# Patient Record
Sex: FEMALE | Race: WHITE | NOT HISPANIC OR LATINO | Employment: FULL TIME | URBAN - METROPOLITAN AREA
[De-identification: names, ages, dates, MRNs, and addresses within clinical notes are randomized per-mention and may not be internally consistent; named-entity substitution may affect disease eponyms.]

---

## 2021-02-25 ENCOUNTER — PRENATAL OFFICE VISIT (OUTPATIENT)
Dept: NURSING | Facility: CLINIC | Age: 36
End: 2021-02-25
Payer: COMMERCIAL

## 2021-02-25 DIAGNOSIS — Z34.90 SUPERVISION OF NORMAL PREGNANCY: Primary | ICD-10-CM

## 2021-02-25 PROCEDURE — 99207 PR NO CHARGE NURSE ONLY: CPT

## 2021-02-25 RX ORDER — PNV NO.95/FERROUS FUM/FOLIC AC 28MG-0.8MG
TABLET ORAL
COMMUNITY

## 2021-02-25 SDOH — HEALTH STABILITY: MENTAL HEALTH: HOW OFTEN DO YOU HAVE 6 OR MORE DRINKS ON ONE OCCASION?: NOT ASKED

## 2021-02-25 SDOH — HEALTH STABILITY: MENTAL HEALTH: HOW MANY STANDARD DRINKS CONTAINING ALCOHOL DO YOU HAVE ON A TYPICAL DAY?: NOT ASKED

## 2021-02-25 SDOH — HEALTH STABILITY: MENTAL HEALTH: HOW OFTEN DO YOU HAVE A DRINK CONTAINING ALCOHOL?: NOT ASKED

## 2021-02-25 NOTE — NURSING NOTE
NPN nurse visit done over the phone. Pt will be given NPN folder and book at her upcoming appt.   Discussed optional screening available to assess chromosomal anomalies. Questions answered. Pt advised to call the clinic if she has any questions or concerns related to her pregnancy. Prenatal labs will be obtained at her upcoming appt. New prenatal visit scheduled on 3/5 with Dr. Edgar Loaiza.    7w4d    Pap is due.        Patient supplied answers from flow sheet for:  Prenatal OB Questionnaire.  Past Medical History  Diabetes?: No  Hypertension : No  Heart disease, mitral valve prolapse or rheumatic fever?: No  An autoimmune disease such as lupus or rheumatoid arthritis?: No  Kidney disease or urinary tract infection?: (!) Yes(hx of UTI)  Epilepsy, seizures or spells?: No  Migraine headaches?: (!) Yes  A stroke or loss of function or sensation?: No  Any other neurological problems?: No  Have you ever been treated for depression?: No  Are you having problems with crying spells or loss of self-esteem?: No  Have you ever required psychiatric care?: No  Have you ever had hepatitis, liver disease or jaundice?: No  Have you been treated for blood clots in your veins, deep vein thromosis, inflammation in the veins, thrombosis, phlebitis, pulmonary embolism or varicosities?: No  Have you had excessive bleeding after surgery or dental work?: (!) Yes(postpartum hemorrhage)  Do you bleed more than other women after a cut or scratch?: No  Do you have a history of anemia?: No  Have you ever had thyroid problems or taken thyroid medication?: No   Do you have any endocrine problems?: No  Have you ever been in a major accident or suffered serious trauma?: No  Within the last year, has anyone hit, slapped, kicked or otherwise hurt you?: No  In the last year, has anyone forced you to have sex when you didn't want to?: No    Past Medical History 2   Have you ever received a blood transfusion?: No  Would you refuse a blood transfusion if  a doctor judged it to be medically necessary?: No   If you answered Yes, would you rather die than receive a blood transfusion?: No  If you answered Yes, is this for Anabaptist reasons?: No  Does anyone in your home smoke?: No  Do you use tobacco products?: No  Do you drink beer, wine or hard liquor?: No  Do you use any of the following: marijuana, speed, cocaine, heroin, hallucinogens or other drugs?: No   Is your blood type Rh negative?: No  Have you ever had abnormal antibodies in your blood?: No  Have you ever had asthma?: No  Have you ever had tuberculosis?: No  Do you have any allergies to drugs or over-the-counter medications?: No  Allergies: Dust Mites, Aspartame, Ethanol, Venlafaxine, Hydrochloride, Sertraline: No  Have you had any breast problems?: No  Have you ever ?: (!) Yes  Have you had any gynecological surgical procedures such as cervical conization, a LEEP procedure, laser treatment, cryosurgery of the cervix or a dilation and curettage, etc?: No  Have you ever had any other surgical procedures?: (!) Yes  Have you been hospitalized for a nonsurgical reason excluding normal delivery?: No  Have you ever had any anesthetic complications?: No  Have you ever had an abnormal pap smear?: (!) Yes(colp done)    Past Medical History (Continued)  Do you have a history of abnormalities of the uterus?: No  Did your mother take MALLIKA or any other hormones when she was pregnant with you?: No  Did it take you more than a year to become pregnant?: No  Have you ever been evaluated or treated for infertility?: No  Is there a history of medical problems in your family, which you feel may be important to this pregnancy?: No  Do you have any other problems we have not asked about which you feel may be important to this pregnancy?: No    Symptoms since last menstrual period  Do you have any of the following symptoms: abdominal pain, blood in stools or urine, chest pain, shortness of breath, coughing or vomiting up  blood, your heart racing or skipping beats, nausea and vomiting, pain on urination or vaginal discharge or bleed: (!) Yes(nausea, fatigue, HA, breast tenderness)  Current medications, including over-the-counter medications, you are using? (If not applicable answer none): pn vitamin  Will the patient be 35 years old or older at the time of delivery?: (!) Yes    Has the patient, baby's father or anyone in either family had:  Thalassemia (Italian, Greek, Mediterranean or  background only) and an MCV result less than 80?: No  Neural tube defect such as meningomyelocele, spina bifida or anencephaly?: No  Congenital heart defect?: No  Down's Syndrome?: No  Kb-Sachs disease (Presybeterian, Cajun, Northern Irish-Crosby)?: No  Sickle cell disease or trait ()?: No  Hemophilia or other inherited problems of blood?: No  Muscular dystrophy?: No  Cystic fibrosis?: No  [place]'s chorea?: No  Mental retardation/autism?: No  If yes, was the person tested for fragile X?: No  Any other inherited genetic or chromosomal disorder?: No  Maternal metabolic disorder (e.g Insulin-dependent diabetes, PKU)?: No  A child with birth defects not listed above?: No  Recurrent pregnancy loss or stillbirth?: No   Has the patient had any medications/street drugs/alcohol since her last menstrual period?: No  Does the patient or baby's father have any other genetic risks?: No    Infection History   Do you object to being tested for Hepatitis B?: No  Do you object to being tested for HIV?: No   Do you feel that you are at high risk for coming in contact with the AIDS virus?: No  Have you ever been treated for tuberculosis?: No  Have you ever had a positive skin test for tuberculosis?: No  Do you live with someone who has tuberculosis?: No  Have you ever been exposed to tuberculosis?: No  Do you have genital herpes?: No  Does your partner have genital herpes?: No  Have you had a viral illness since your last period?: No  Have you ever had gonorrhea,  chlamydia, syphilis, venereal warts, trichomoniasis, pelvic inflammatory disease or any other sexually transmitted disease?: No  Do you know if you are a genital group B streptococcus carrier?: No  Have you had chicken pox/varicella?: (!) Yes   Have you been vaccinated against chicken Pox?: No  Have you had any other infectious diseases?: No    Tootie JOSE RN

## 2021-03-05 ENCOUNTER — PRENATAL OFFICE VISIT (OUTPATIENT)
Dept: OBGYN | Facility: CLINIC | Age: 36
End: 2021-03-05
Payer: COMMERCIAL

## 2021-03-05 ENCOUNTER — ANCILLARY PROCEDURE (OUTPATIENT)
Dept: ULTRASOUND IMAGING | Facility: CLINIC | Age: 36
End: 2021-03-05
Payer: COMMERCIAL

## 2021-03-05 VITALS — SYSTOLIC BLOOD PRESSURE: 110 MMHG | DIASTOLIC BLOOD PRESSURE: 70 MMHG | WEIGHT: 131 LBS

## 2021-03-05 DIAGNOSIS — Z34.90 SUPERVISION OF NORMAL PREGNANCY: ICD-10-CM

## 2021-03-05 DIAGNOSIS — Z11.3 SCREEN FOR STD (SEXUALLY TRANSMITTED DISEASE): ICD-10-CM

## 2021-03-05 DIAGNOSIS — Z12.4 SCREENING FOR CERVICAL CANCER: ICD-10-CM

## 2021-03-05 DIAGNOSIS — Z34.80 SUPERVISION OF OTHER NORMAL PREGNANCY, ANTEPARTUM: Primary | ICD-10-CM

## 2021-03-05 DIAGNOSIS — Z34.81 ENCOUNTER FOR SUPERVISION OF OTHER NORMAL PREGNANCY IN FIRST TRIMESTER: ICD-10-CM

## 2021-03-05 LAB
ABO + RH BLD: NORMAL
ABO + RH BLD: NORMAL
BLD GP AB SCN SERPL QL: NORMAL
BLOOD BANK CMNT PATIENT-IMP: NORMAL
ERYTHROCYTE [DISTWIDTH] IN BLOOD BY AUTOMATED COUNT: 12.3 % (ref 10–15)
HCT VFR BLD AUTO: 39.1 % (ref 35–47)
HGB BLD-MCNC: 12.6 G/DL (ref 11.7–15.7)
MCH RBC QN AUTO: 32.8 PG (ref 26.5–33)
MCHC RBC AUTO-ENTMCNC: 32.2 G/DL (ref 31.5–36.5)
MCV RBC AUTO: 102 FL (ref 78–100)
PLATELET # BLD AUTO: 220 10E9/L (ref 150–450)
RBC # BLD AUTO: 3.84 10E12/L (ref 3.8–5.2)
SPECIMEN EXP DATE BLD: NORMAL
WBC # BLD AUTO: 7.3 10E9/L (ref 4–11)

## 2021-03-05 PROCEDURE — 87591 N.GONORRHOEAE DNA AMP PROB: CPT | Performed by: OBSTETRICS & GYNECOLOGY

## 2021-03-05 PROCEDURE — 86850 RBC ANTIBODY SCREEN: CPT | Performed by: OBSTETRICS & GYNECOLOGY

## 2021-03-05 PROCEDURE — 87491 CHLMYD TRACH DNA AMP PROBE: CPT | Performed by: OBSTETRICS & GYNECOLOGY

## 2021-03-05 PROCEDURE — 87340 HEPATITIS B SURFACE AG IA: CPT | Performed by: OBSTETRICS & GYNECOLOGY

## 2021-03-05 PROCEDURE — 86780 TREPONEMA PALLIDUM: CPT | Mod: 90 | Performed by: OBSTETRICS & GYNECOLOGY

## 2021-03-05 PROCEDURE — 86901 BLOOD TYPING SEROLOGIC RH(D): CPT | Performed by: OBSTETRICS & GYNECOLOGY

## 2021-03-05 PROCEDURE — 99207 PR FIRST OB VISIT: CPT | Performed by: OBSTETRICS & GYNECOLOGY

## 2021-03-05 PROCEDURE — 87086 URINE CULTURE/COLONY COUNT: CPT | Performed by: OBSTETRICS & GYNECOLOGY

## 2021-03-05 PROCEDURE — 76801 OB US < 14 WKS SINGLE FETUS: CPT | Performed by: OBSTETRICS & GYNECOLOGY

## 2021-03-05 PROCEDURE — 87389 HIV-1 AG W/HIV-1&-2 AB AG IA: CPT | Performed by: OBSTETRICS & GYNECOLOGY

## 2021-03-05 PROCEDURE — 99000 SPECIMEN HANDLING OFFICE-LAB: CPT | Performed by: OBSTETRICS & GYNECOLOGY

## 2021-03-05 PROCEDURE — 36415 COLL VENOUS BLD VENIPUNCTURE: CPT | Performed by: OBSTETRICS & GYNECOLOGY

## 2021-03-05 PROCEDURE — 85027 COMPLETE CBC AUTOMATED: CPT | Performed by: OBSTETRICS & GYNECOLOGY

## 2021-03-05 PROCEDURE — 86762 RUBELLA ANTIBODY: CPT | Performed by: OBSTETRICS & GYNECOLOGY

## 2021-03-05 PROCEDURE — 87624 HPV HI-RISK TYP POOLED RSLT: CPT | Performed by: OBSTETRICS & GYNECOLOGY

## 2021-03-05 PROCEDURE — 86900 BLOOD TYPING SEROLOGIC ABO: CPT | Performed by: OBSTETRICS & GYNECOLOGY

## 2021-03-05 PROCEDURE — G0145 SCR C/V CYTO,THINLAYER,RESCR: HCPCS | Performed by: OBSTETRICS & GYNECOLOGY

## 2021-03-05 NOTE — NURSING NOTE
Chief Complaint   Patient presents with     Prenatal Care     8 5/7 weeks       Initial /70   Wt 59.4 kg (131 lb)   LMP 2021 (Exact Date)  There is no height or weight on file to calculate BMI.  BP completed using cuff size: regular    Questioned patient about current smoking habits.  Pt. has never smoked.          The following HM Due: pap smear    Ivory Cadena, CMA

## 2021-03-05 NOTE — PROGRESS NOTES
Carri is a 35 year old  at 8w5d here for new ob visit.      Here with FOB Partner Travis.  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   Planned pregnancy. She has a 10 year old daughter, had a retained placenta and PPH in that pregnancy.  Thinks she may be rh neg.   Last pap  was NILM, due again.   AMA - will  Be barely 36 at time of delivery    OB History    Para Term  AB Living   2 1 1 0 0 1   SAB TAB Ectopic Multiple Live Births   0 0 0 0 1      # Outcome Date GA Lbr Dale/2nd Weight Sex Delivery Anes PTL Lv   2 Current            1 Term 06/01/10 39w6d  3.175 kg (7 lb) F Vag-Spont EPI N KIARRA      Complications: Hemorrhage      Name: Hortencia       ROS: Ten point review of systems was reviewed and negative except the above.    No past medical history on file.  Past Surgical History:   Procedure Laterality Date     CO BREAST AUGMENTATION Bilateral      There are no active problems to display for this patient.     No Known Allergies  Prenatal Vit-Fe Fumarate-FA (PRENATAL VITAMIN) 27-0.8 MG TABS,     No current facility-administered medications on file prior to visit.       Physical Exam:   /70   Wt 59.4 kg (131 lb)   LMP 2021 (Exact Date)     Gen:  no acute distress, comfortable, smiling  HENT: No scleral injection or icterus  CV: Regular rate and rhythm, no m/g/r  Resp: Normal work of breathing, no cough  GI: Abdomen soft, non-tender. No masses, organomegaly  Skin: No suspicious lesions or rashes  Psychiatric: mentation appears normal and affect bright  : retroverted uterus, gc/chlam and pap obtained with scant bleeding from cervix    FH cwd  175 bpm on US    A/P 35 year old  at 8w5d here for NOB visit.  - Discussed physician coverage, tertiary support, diet, exercise, weight gain, schedule of visits, routine and indicated ultrasounds, and childbirth education.  Discussed MFM coverage and reviewed options for  testing in the first  trimester.  Recommended PNV.  NOB labs and US reviewed.       Concerns:  - labs pending.  FOB Travis.  - h/o PPH with retained placenta  - AMA: plan level 2 US.  NIPT offered after 10 wks, will consider.     RTC 4 weeks    Va Loaiza MD, MPH  [facility]

## 2021-03-06 LAB
BACTERIA SPEC CULT: NORMAL
C TRACH DNA SPEC QL NAA+PROBE: NEGATIVE
Lab: NORMAL
N GONORRHOEA DNA SPEC QL NAA+PROBE: NEGATIVE
SPECIMEN SOURCE: NORMAL
T PALLIDUM AB SER QL: NONREACTIVE

## 2021-03-08 LAB
HBV SURFACE AG SERPL QL IA: NONREACTIVE
HIV 1+2 AB+HIV1 P24 AG SERPL QL IA: NONREACTIVE
RUBV IGG SERPL IA-ACNC: 73 IU/ML

## 2021-03-09 LAB
COPATH REPORT: NORMAL
PAP: NORMAL

## 2021-03-11 LAB
FINAL DIAGNOSIS: NORMAL
HPV HR 12 DNA CVX QL NAA+PROBE: NEGATIVE
HPV16 DNA SPEC QL NAA+PROBE: NEGATIVE
HPV18 DNA SPEC QL NAA+PROBE: NEGATIVE
SPECIMEN DESCRIPTION: NORMAL
SPECIMEN SOURCE CVX/VAG CYTO: NORMAL

## 2021-03-14 ENCOUNTER — HEALTH MAINTENANCE LETTER (OUTPATIENT)
Age: 36
End: 2021-03-14

## 2021-03-29 ENCOUNTER — PRENATAL OFFICE VISIT (OUTPATIENT)
Dept: OBGYN | Facility: CLINIC | Age: 36
End: 2021-03-29
Payer: COMMERCIAL

## 2021-03-29 ENCOUNTER — TELEPHONE (OUTPATIENT)
Dept: OBGYN | Facility: CLINIC | Age: 36
End: 2021-03-29

## 2021-03-29 ENCOUNTER — TRANSCRIBE ORDERS (OUTPATIENT)
Dept: MATERNAL FETAL MEDICINE | Facility: CLINIC | Age: 36
End: 2021-03-29

## 2021-03-29 VITALS — SYSTOLIC BLOOD PRESSURE: 110 MMHG | WEIGHT: 131 LBS | DIASTOLIC BLOOD PRESSURE: 68 MMHG

## 2021-03-29 DIAGNOSIS — Z11.52 ENCOUNTER FOR PREOPERATIVE SCREENING LABORATORY TESTING FOR COVID-19 VIRUS: Primary | ICD-10-CM

## 2021-03-29 DIAGNOSIS — O09.529 HIGH-RISK PREGNANCY, ELDERLY MULTIGRAVIDA, UNSPECIFIED TRIMESTER: ICD-10-CM

## 2021-03-29 DIAGNOSIS — Z67.91 RH NEGATIVE STATE IN ANTEPARTUM PERIOD: ICD-10-CM

## 2021-03-29 DIAGNOSIS — O03.4 INCOMPLETE MISCARRIAGE: Primary | ICD-10-CM

## 2021-03-29 DIAGNOSIS — O26.90 PREGNANCY RELATED CONDITION, ANTEPARTUM: Primary | ICD-10-CM

## 2021-03-29 DIAGNOSIS — O26.899 RH NEGATIVE STATE IN ANTEPARTUM PERIOD: ICD-10-CM

## 2021-03-29 DIAGNOSIS — Z01.812 ENCOUNTER FOR PREOPERATIVE SCREENING LABORATORY TESTING FOR COVID-19 VIRUS: Primary | ICD-10-CM

## 2021-03-29 PROCEDURE — 76801 OB US < 14 WKS SINGLE FETUS: CPT | Performed by: OBSTETRICS & GYNECOLOGY

## 2021-03-29 PROCEDURE — 99215 OFFICE O/P EST HI 40 MIN: CPT | Performed by: OBSTETRICS & GYNECOLOGY

## 2021-03-29 NOTE — PROGRESS NOTES
SUBJECTIVE:                                                   CC:  Patient presents with:  Prenatal Care: 12 1/7 weeks      HPI:  Carri Sauer is a 35 year old  who presents for PNC at 12w1d.  No FHT found on doptones, none on BSUS.  She was sent immediately for a formal ultrasound which showed no FHT and measuring 9w4d.    No cramping or bleeding.  Is RH neg, will need rhogam.    OBJECTIVE:     /68   Wt 59.4 kg (131 lb)   LMP 2021 (Exact Date)     Gen: Healthy appearing thin female, no acute distress, comfortable, appropriately tearful when discussing results  Heart RRR  Lungs CTAB  Abd soft flat NT  Ext WWP   deferred today     Test Results:  BSUS with no FHT, measuring 9 weeks    Final formal US read pending - I reviewed the images from the formal US and saw CRL measuring 9w4d with no FHT      ASSESSMENT/PLAN:                                                      1. Incomplete miscarriage  Gave condolences, reviewed options for mgmt.  She will consider the options (expectant, medical, surgical) and call back or return.  Also discussed cytogenetics.      2. Rh negative state in antepartum period  Will need Rhogam post operatively or if passing the tissue spontaneously.      Va Loazia MD, MPH  Obstetrics and Gynecology      Total time spent was 47 minutes; this includes pre-work time, intra-service time, and post-work time including time spent on documentation which occurred on the date of service.

## 2021-03-29 NOTE — TELEPHONE ENCOUNTER
Procedure name(s) - multi select suction D&C   Reason for procedure miscarriage   Is this a multi surgeon case? No   Laterality N/A   Request for additional equipment Other (see comments)   Comment: None   Anesthesia Choice   Initiate Pre-op orders for above procedure: Yes, as ordered in Epic   Comment: Additional orders noted there also   Location of Case: [facility]   [facility]   Surgeon Procedure Time (incision to closure) in minutes (per procedure as applicable) 20   Note:  Surgical Case Time Needed (in minutes)   Patient Class (for admit prior to surgery, specify number of days in comments): Same day (surgery center outpatient)   Vendor Needed? No   Other/Additional Comments, as needed: could do Friday 4/2 on call?

## 2021-03-29 NOTE — TELEPHONE ENCOUNTER
Type of surgery: suction d&c  Location of surgery: [facility] [place]  Date and time of surgery: 4/2/21 @ 12:00 pm  Surgeon: Dr. Edgar Loaiza  Pre-Op Appt Date: 3/29/21  Post-Op Appt Date:    Packet sent out: Yes  Pre-cert/Authorization completed:  No  Date: 3/29/21

## 2021-03-29 NOTE — NURSING NOTE
Chief Complaint   Patient presents with     Prenatal Care     12 7 weeks       Initial /68   Wt 59.4 kg (131 lb)   LMP 2021 (Exact Date)  There is no height or weight on file to calculate BMI.  BP completed using cuff size: regular    Questioned patient about current smoking habits.  Pt. has never smoked.          The following HM Due: NONE    Ivory Cadena, FLOWER

## 2021-03-30 DIAGNOSIS — Z11.52 ENCOUNTER FOR PREOPERATIVE SCREENING LABORATORY TESTING FOR COVID-19 VIRUS: ICD-10-CM

## 2021-03-30 DIAGNOSIS — Z01.812 ENCOUNTER FOR PREOPERATIVE SCREENING LABORATORY TESTING FOR COVID-19 VIRUS: ICD-10-CM

## 2021-03-30 LAB
LABORATORY COMMENT REPORT: NORMAL
SARS-COV-2 RNA RESP QL NAA+PROBE: NEGATIVE
SARS-COV-2 RNA RESP QL NAA+PROBE: NORMAL
SPECIMEN SOURCE: NORMAL
SPECIMEN SOURCE: NORMAL

## 2021-03-30 PROCEDURE — U0005 INFEC AGEN DETEC AMPLI PROBE: HCPCS | Performed by: OBSTETRICS & GYNECOLOGY

## 2021-03-30 PROCEDURE — U0003 INFECTIOUS AGENT DETECTION BY NUCLEIC ACID (DNA OR RNA); SEVERE ACUTE RESPIRATORY SYNDROME CORONAVIRUS 2 (SARS-COV-2) (CORONAVIRUS DISEASE [COVID-19]), AMPLIFIED PROBE TECHNIQUE, MAKING USE OF HIGH THROUGHPUT TECHNOLOGIES AS DESCRIBED BY CMS-2020-01-R: HCPCS | Performed by: OBSTETRICS & GYNECOLOGY

## 2021-03-30 NOTE — RESULT ENCOUNTER NOTE
Thanks. I guess I missed the comment about living intrauterine pregnancy seen. I have deleted that comment in the addendum above.   Thanks for pointing it out.     Ángel Sorto MD

## 2021-04-02 ENCOUNTER — HOSPITAL LABORATORY (OUTPATIENT)
Dept: OTHER | Facility: CLINIC | Age: 36
End: 2021-04-02

## 2021-04-02 ENCOUNTER — HOSPITAL PATHOLOGY (OUTPATIENT)
Dept: OTHER | Facility: CLINIC | Age: 36
End: 2021-04-02

## 2021-04-02 ENCOUNTER — HOSPITAL (OUTPATIENT)
Dept: OBGYN | Facility: CLINIC | Age: 36
End: 2021-04-02

## 2021-04-02 NOTE — PROGRESS NOTES
Operative Note - Suction D&C    Patient: Carri Sauer  : 1985  MRN: [id number]    Date of Service: 2021    Pre-operative diagnosis: missed AB    Post-operative diagnosis: same    Procedure: Suction dilation and curettage    Surgeon: Va Loaiza MD    Anesthesia: General  EBL: 250cc  Urine: 50cc  Fluids: see anesthesia record    Specimens: products of conception for pathology and cytogenetics  Complications: none    Findings: moderate to large amount of POC seen through the suction tubing.  cvx dilated to 9mm.  Rhogam given at the close of the procedure.     Indications: Carri Sauer is a 35 year old  at 12w5d with a missed AB measuring 9w4d.  She was counseled on options and chose a D&C.  She was counseled regarding the risks and alternatives of suction D&C, including perforation and scarring. The patient agreed to proceed, and signed written informed consent.     Procedure: The patient was taken to the operating room where she was placed in the dorsal lithotomy position. Sedation was administered and the patient was prepped and draped in the usual sterile fashion. A speculum was inserted into the vagina and the cervix visualized. A single-toothed tenaculum was placed at 12 o'clock on the cervix.  The cervix was dilated using hegar dilators up to 9 mm. A 9 mm curved suction curet was inserted through the cervix to the uterine fundus and suction applied to 60 PSI. The curet was rotated in the uterine cavity with return of tissue. This process was repeated numerous times. A sharp curet was used on all aspects of the uterine cavity with minimal return of tissue and a gritty texture throughout. The suction contents were sent to pathology for examination.   The tenaculum was removed from the cervix and good hemostasis noted. The speculum was removed from the vagina. The patient tolerated the procedure well and was taken to the recovery room in stable condition.  Rhogam was given  in the OR.        Va Loaiza MD, MPH  Obstetrics and Gynecology

## 2021-04-13 LAB
DNA INDEX SPEC FC-ACNC: 1.53
DNA PLOIDY SPEC FC: NORMAL
PATHOLOGY STUDY: NORMAL
SPECIMEN SOURCE: NORMAL

## 2021-04-14 DIAGNOSIS — O08.89 PARTIAL MOLAR PREGNANCY: Primary | ICD-10-CM

## 2021-04-14 DIAGNOSIS — O08.89 PARTIAL MOLAR PREGNANCY: ICD-10-CM

## 2021-04-14 PROCEDURE — 36415 COLL VENOUS BLD VENIPUNCTURE: CPT | Performed by: OBSTETRICS & GYNECOLOGY

## 2021-04-14 PROCEDURE — 84702 CHORIONIC GONADOTROPIN TEST: CPT | Performed by: OBSTETRICS & GYNECOLOGY

## 2021-04-15 LAB
B-HCG SERPL-ACNC: 249 IU/L (ref 0–5)
COPATH REPORT: NORMAL

## 2021-04-20 LAB — COPATH REPORT: NORMAL

## 2021-04-21 DIAGNOSIS — O08.89 PARTIAL MOLAR PREGNANCY: ICD-10-CM

## 2021-04-21 LAB — B-HCG SERPL-ACNC: 71 IU/L (ref 0–5)

## 2021-04-21 PROCEDURE — 84702 CHORIONIC GONADOTROPIN TEST: CPT | Performed by: OBSTETRICS & GYNECOLOGY

## 2021-04-21 PROCEDURE — 36415 COLL VENOUS BLD VENIPUNCTURE: CPT | Performed by: OBSTETRICS & GYNECOLOGY

## 2021-04-28 DIAGNOSIS — O08.89 PARTIAL MOLAR PREGNANCY: ICD-10-CM

## 2021-04-28 PROCEDURE — 84702 CHORIONIC GONADOTROPIN TEST: CPT | Performed by: OBSTETRICS & GYNECOLOGY

## 2021-04-28 PROCEDURE — 36415 COLL VENOUS BLD VENIPUNCTURE: CPT | Performed by: OBSTETRICS & GYNECOLOGY

## 2021-04-29 LAB — B-HCG SERPL-ACNC: 33 IU/L (ref 0–5)

## 2021-05-05 DIAGNOSIS — O08.89 PARTIAL MOLAR PREGNANCY: ICD-10-CM

## 2021-05-05 LAB — B-HCG SERPL-ACNC: 20 IU/L (ref 0–5)

## 2021-05-05 PROCEDURE — 84702 CHORIONIC GONADOTROPIN TEST: CPT | Performed by: OBSTETRICS & GYNECOLOGY

## 2021-05-05 PROCEDURE — 36415 COLL VENOUS BLD VENIPUNCTURE: CPT | Performed by: OBSTETRICS & GYNECOLOGY

## 2021-05-12 DIAGNOSIS — O08.89 PARTIAL MOLAR PREGNANCY: ICD-10-CM

## 2021-05-12 LAB — B-HCG SERPL-ACNC: 15 IU/L (ref 0–5)

## 2021-05-12 PROCEDURE — 36415 COLL VENOUS BLD VENIPUNCTURE: CPT | Performed by: OBSTETRICS & GYNECOLOGY

## 2021-05-12 PROCEDURE — 84702 CHORIONIC GONADOTROPIN TEST: CPT | Performed by: OBSTETRICS & GYNECOLOGY

## 2021-05-19 DIAGNOSIS — O08.89 PARTIAL MOLAR PREGNANCY: ICD-10-CM

## 2021-05-19 PROCEDURE — 84702 CHORIONIC GONADOTROPIN TEST: CPT | Performed by: OBSTETRICS & GYNECOLOGY

## 2021-05-19 PROCEDURE — 36415 COLL VENOUS BLD VENIPUNCTURE: CPT | Performed by: OBSTETRICS & GYNECOLOGY

## 2021-05-20 LAB — B-HCG SERPL-ACNC: 10 IU/L (ref 0–5)

## 2021-05-26 DIAGNOSIS — O08.89 PARTIAL MOLAR PREGNANCY: ICD-10-CM

## 2021-05-26 PROCEDURE — 36415 COLL VENOUS BLD VENIPUNCTURE: CPT | Performed by: OBSTETRICS & GYNECOLOGY

## 2021-05-26 PROCEDURE — 84702 CHORIONIC GONADOTROPIN TEST: CPT | Performed by: OBSTETRICS & GYNECOLOGY

## 2021-05-27 LAB — B-HCG SERPL-ACNC: 7 IU/L (ref 0–5)

## 2021-06-02 DIAGNOSIS — O08.89 PARTIAL MOLAR PREGNANCY: ICD-10-CM

## 2021-06-02 PROCEDURE — 36415 COLL VENOUS BLD VENIPUNCTURE: CPT | Performed by: OBSTETRICS & GYNECOLOGY

## 2021-06-02 PROCEDURE — 84702 CHORIONIC GONADOTROPIN TEST: CPT | Performed by: OBSTETRICS & GYNECOLOGY

## 2021-06-03 LAB — B-HCG SERPL-ACNC: 5 IU/L (ref 0–5)

## 2021-06-09 DIAGNOSIS — O08.89 PARTIAL MOLAR PREGNANCY: ICD-10-CM

## 2021-06-09 DIAGNOSIS — O08.89 PARTIAL MOLAR PREGNANCY: Primary | ICD-10-CM

## 2021-06-09 PROCEDURE — 84702 CHORIONIC GONADOTROPIN TEST: CPT | Performed by: OBSTETRICS & GYNECOLOGY

## 2021-06-09 PROCEDURE — 36415 COLL VENOUS BLD VENIPUNCTURE: CPT | Performed by: OBSTETRICS & GYNECOLOGY

## 2021-06-10 LAB — B-HCG SERPL-ACNC: 5 IU/L (ref 0–5)

## 2021-06-16 DIAGNOSIS — O08.89 PARTIAL MOLAR PREGNANCY: ICD-10-CM

## 2021-06-16 PROCEDURE — 84702 CHORIONIC GONADOTROPIN TEST: CPT | Performed by: OBSTETRICS & GYNECOLOGY

## 2021-06-16 PROCEDURE — 36415 COLL VENOUS BLD VENIPUNCTURE: CPT | Performed by: OBSTETRICS & GYNECOLOGY

## 2021-06-17 ENCOUNTER — IMMUNIZATION (OUTPATIENT)
Dept: LAB | Facility: CLINIC | Age: 36
End: 2021-06-17
Payer: COMMERCIAL

## 2021-06-17 LAB — B-HCG SERPL-ACNC: 3 IU/L (ref 0–5)

## 2021-06-18 ENCOUNTER — OFFICE VISIT (OUTPATIENT)
Dept: OBGYN | Facility: CLINIC | Age: 36
End: 2021-06-18
Payer: COMMERCIAL

## 2021-06-18 VITALS
SYSTOLIC BLOOD PRESSURE: 110 MMHG | BODY MASS INDEX: 22.53 KG/M2 | DIASTOLIC BLOOD PRESSURE: 78 MMHG | WEIGHT: 132 LBS | HEIGHT: 64 IN

## 2021-06-18 DIAGNOSIS — N90.89 LABIAL LESION: Primary | ICD-10-CM

## 2021-06-18 PROCEDURE — 99213 OFFICE O/P EST LOW 20 MIN: CPT | Mod: 24 | Performed by: OBSTETRICS & GYNECOLOGY

## 2021-06-18 ASSESSMENT — MIFFLIN-ST. JEOR: SCORE: 1283.75

## 2021-06-18 NOTE — NURSING NOTE
"Chief Complaint   Patient presents with     Vaginal Problem     left sided lump very painful       Initial /78 (BP Location: Left arm, Patient Position: Chair, Cuff Size: Adult Regular)   Ht 1.626 m (5' 4\")   Wt 59.9 kg (132 lb)   LMP 2021 (Exact Date)   Breastfeeding No   BMI 22.66 kg/m   Estimated body mass index is 22.66 kg/m  as calculated from the following:    Height as of this encounter: 1.626 m (5' 4\").    Weight as of this encounter: 59.9 kg (132 lb).  BP completed using cuff size: regular    Questioned patient about current smoking habits.  Pt. has never smoked.          The following HM Due: NONE    Ivory Cadena CMA    "

## 2021-06-21 NOTE — RESULT ENCOUNTER NOTE
Results discussed directly with patient while patient was present during in person visit. Any further details documented in the note.   Va Loaiza MD

## 2021-07-13 ENCOUNTER — LAB (OUTPATIENT)
Dept: LAB | Facility: CLINIC | Age: 36
End: 2021-07-13
Payer: COMMERCIAL

## 2021-07-13 DIAGNOSIS — O08.89 PARTIAL MOLAR PREGNANCY: ICD-10-CM

## 2021-07-13 LAB — B-HCG SERPL-ACNC: 1 IU/L (ref 0–5)

## 2021-07-13 PROCEDURE — 84702 CHORIONIC GONADOTROPIN TEST: CPT

## 2021-07-13 PROCEDURE — 36415 COLL VENOUS BLD VENIPUNCTURE: CPT

## 2021-10-24 ENCOUNTER — HEALTH MAINTENANCE LETTER (OUTPATIENT)
Age: 36
End: 2021-10-24

## 2022-01-03 ENCOUNTER — MYC MEDICAL ADVICE (OUTPATIENT)
Dept: OBGYN | Facility: CLINIC | Age: 37
End: 2022-01-03
Payer: COMMERCIAL

## 2022-01-03 DIAGNOSIS — O08.89 PARTIAL MOLAR PREGNANCY: Primary | ICD-10-CM

## 2022-01-10 ENCOUNTER — TELEPHONE (OUTPATIENT)
Dept: OBGYN | Facility: CLINIC | Age: 37
End: 2022-01-10
Payer: COMMERCIAL

## 2022-01-10 ENCOUNTER — LAB (OUTPATIENT)
Dept: LAB | Facility: CLINIC | Age: 37
End: 2022-01-10
Payer: COMMERCIAL

## 2022-01-10 DIAGNOSIS — O08.89 PARTIAL MOLAR PREGNANCY: ICD-10-CM

## 2022-01-10 LAB — B-HCG SERPL-ACNC: 21 IU/L (ref 0–5)

## 2022-01-10 PROCEDURE — 36415 COLL VENOUS BLD VENIPUNCTURE: CPT

## 2022-01-10 PROCEDURE — 84702 CHORIONIC GONADOTROPIN TEST: CPT

## 2022-01-10 NOTE — TELEPHONE ENCOUNTER
Pt with LMP Dec 9, so about 4w4d    Had some spotting, red and brown.  One incident of this bleeding so unsure how heavy this will be.  No cramping, some back ache.   Lab Results   Component Value Date    ABO O 03/05/2021    RH Neg 03/05/2021       [facility] Do [facility] want her to rhogam tomorrow?    Toshia NICHOLS RN BSN

## 2022-01-11 ENCOUNTER — ALLIED HEALTH/NURSE VISIT (OUTPATIENT)
Dept: NURSING | Facility: CLINIC | Age: 37
End: 2022-01-11
Payer: COMMERCIAL

## 2022-01-11 VITALS — WEIGHT: 132 LBS | BODY MASS INDEX: 22.53 KG/M2 | HEIGHT: 64 IN

## 2022-01-11 DIAGNOSIS — Z67.91 RH NEGATIVE STATE IN ANTEPARTUM PERIOD: Primary | ICD-10-CM

## 2022-01-11 DIAGNOSIS — O26.899 RH NEGATIVE STATE IN ANTEPARTUM PERIOD: Primary | ICD-10-CM

## 2022-01-11 PROCEDURE — 96372 THER/PROPH/DIAG INJ SC/IM: CPT | Performed by: OBSTETRICS & GYNECOLOGY

## 2022-01-11 PROCEDURE — 99207 PR NO CHARGE NURSE ONLY: CPT

## 2022-01-11 ASSESSMENT — MIFFLIN-ST. JEOR: SCORE: 1278.75

## 2022-01-11 NOTE — TELEPHONE ENCOUNTER
Sorry to hear this.    Due to her past history of molar pregnancy, I would recommend we continue to follow HCGs until negative, and then get another one in about 6 weeks.     I would also recommend giving rhogam now.     If any tissue comes out from the pregnancy, I would also recommend that we send it for pathology to analyze, due to her past history of molar pregnancy.  She could keep it in a cup and just bring it in to the lab when she comes. Just let us know if she does have any tissue to provide, and we will put in a pathology order.    Thanks  Va Loaiza MD, MPH  [facility]

## 2022-01-11 NOTE — TELEPHONE ENCOUNTER
Pt with heavier bleeding today. Like a period.    Coming for rhogam today.  Will do another HCG level tomorrow at walk in lab.    Discussed possibility of miscarriage. We wont know for sure until we do another HCG level.  Pt understands.    Toshia NICHOLS RN BSN

## 2022-01-11 NOTE — PROGRESS NOTES
Patient is here for a Rhogam injection due to a RH negative blood type.    The following medication was given:     MEDICATION: RhoGam 300 ug  ROUTE: IM  SITE: Arm - Left  DOSE: 1500IU  LOT #: [id number]  :  [employer]  EXPIRATION DATE:  12/12/2022  NDC#: 0063-0455-99    Patient requested that the injection be given in her left arm. Patient sat for 15 minutes following the injection.    Hang Stewart CMA

## 2022-01-12 ENCOUNTER — LAB (OUTPATIENT)
Dept: LAB | Facility: CLINIC | Age: 37
End: 2022-01-12
Payer: COMMERCIAL

## 2022-01-12 DIAGNOSIS — O08.89 PARTIAL MOLAR PREGNANCY: ICD-10-CM

## 2022-01-12 LAB — B-HCG SERPL-ACNC: 8 IU/L (ref 0–5)

## 2022-01-12 PROCEDURE — 84702 CHORIONIC GONADOTROPIN TEST: CPT

## 2022-01-12 PROCEDURE — 36415 COLL VENOUS BLD VENIPUNCTURE: CPT

## 2022-04-10 ENCOUNTER — HEALTH MAINTENANCE LETTER (OUTPATIENT)
Age: 37
End: 2022-04-10

## 2022-06-05 ENCOUNTER — HOSPITAL ENCOUNTER (EMERGENCY)
Facility: CLINIC | Age: 37
Discharge: HOME | End: 2022-06-05
Attending: EMERGENCY MEDICINE | Admitting: EMERGENCY MEDICINE
Payer: COMMERCIAL

## 2022-06-05 ENCOUNTER — APPOINTMENT (OUTPATIENT)
Dept: GENERAL RADIOLOGY | Facility: CLINIC | Age: 37
End: 2022-06-05
Attending: EMERGENCY MEDICINE
Payer: COMMERCIAL

## 2022-06-05 DIAGNOSIS — G24.3 SPASMODIC TORTICOLLIS: ICD-10-CM

## 2022-06-05 PROCEDURE — 250N000013 HC RX MED GY IP 250 OP 250 PS 637: Performed by: EMERGENCY MEDICINE

## 2022-06-05 PROCEDURE — 72040 X-RAY EXAM NECK SPINE 2-3 VW: CPT

## 2022-06-05 PROCEDURE — 99284 EMERGENCY DEPT VISIT MOD MDM: CPT

## 2022-06-05 RX ORDER — IBUPROFEN 800 MG/1
800 TABLET, FILM COATED ORAL ONCE
Status: COMPLETED | OUTPATIENT
Start: 2022-06-05 | End: 2022-06-05

## 2022-06-05 RX ORDER — CYCLOBENZAPRINE HCL 10 MG
10 TABLET ORAL 3 TIMES DAILY PRN
Qty: 10 TABLET | Refills: 0 | Status: SHIPPED | OUTPATIENT
Start: 2022-06-05 | End: 2022-09-13

## 2022-06-05 RX ORDER — CYCLOBENZAPRINE HCL 10 MG
10 TABLET ORAL ONCE
Status: COMPLETED | OUTPATIENT
Start: 2022-06-05 | End: 2022-06-05

## 2022-06-05 RX ADMIN — CYCLOBENZAPRINE HYDROCHLORIDE 10 MG: 10 TABLET, FILM COATED ORAL at 21:11

## 2022-06-05 RX ADMIN — IBUPROFEN 800 MG: 800 TABLET ORAL at 21:11

## 2022-06-05 ASSESSMENT — ENCOUNTER SYMPTOMS
NECK STIFFNESS: 1
NECK PAIN: 1

## 2022-06-06 NOTE — ED PROVIDER NOTES
History   Chief Complaint:  Neck Pain     The history is provided by the patient.      Carri Sauer is a 36 year old female who presents with neck pain. The patient reports that she woke up about two days ago and could not look right but could still look left. Since then, her neck pain has worsened. Any movement causes pain on both sides of her neck and into her shoulders. She notes a bump to the back of her neck where the pain started. She denies any inciting events, such as new exercises, lifting, trauma, repetitive movements, or positional changes while sleeping. She took Tylenol prior to arrival.    Review of Systems   Musculoskeletal: Positive for neck pain and neck stiffness.   All other systems reviewed and are negative.        Allergies:  No Known Allergies    Medications:  The patient is not currently taking any daily medications.     Past Medical History:     The patient denies past medical history.     Past Surgical History:    Bilateral breast augmentation  [place] teeth extraction      Family History:    The patient denies past family history.     Social History:  Presents to ED with significant other.     Physical Exam     Physical Exam  Constitutional: Alert, attentive  HENT:    Nose: Nose normal.    Mouth/Throat: Oropharynx is clear, mucous membranes are moist   Eyes: EOM are normal.   CV: regular rate and rhythm; no murmurs, rubs or gallups  Chest: Effort normal and breath sounds normal.   GI:  There is no tenderness. No distension. Normal bowel sounds  MSK: Normal range of motion.    Spasm and tenderness to the bilateral upper trapezius and cervical perispinous muscles; no midline tenderness or stepoffs  Neurological: Alert, attentive   5/5 strength to upper extremities   Sensation intact  Skin: Skin is warm and dry.      Emergency Department Course     Imaging:  Cervical spine XR, 2-3 views   Final Result   IMPRESSION: The prevertebral soft tissues and the predental space are maintained  maintained. There is good anatomic alignment of the cervical spine with no evidence of subluxation but there is a reversal of the normal cervical lordosis. There is minimal    degenerative disc disease at the C4-C5 and the C5-C6 disc space levels with a slight loss of disc space heights.           Report per radiology    Emergency Department Course:     Reviewed:  I reviewed nursing notes, vitals, past medical history and Care Everywhere.    Assessments:  2043 I obtained history and examined the patient as noted above.   2201 I rechecked the patient and explained findings.     Interventions:  2111 Flexeril 10 mg, PO  2111 Ibuprofen 800 mg, PO    Disposition:  The patient was discharged to home.     Impression & Plan     Medical Decision Making:  This is a pleasant 36-year-old female presents for evaluation of atraumatic neck muscle pain and tightness.  History and exam are consistent with spasmodic torticollis.  X-ray shows no congenital or other abnormality based on reported history of fall with neck injury as a child.  No midline tenderness, fever, weakness, or other red flags to indicate MRI imaging.  No headache to suggest meningitis.  No stroke symptoms suggest cervical vessel dissection.  Symptoms are improved with supportive cares.  She has normal neurologic exam.  Plan continued supportive cares and spine referral as needed.  Return precautions for worse pain, fever, or any other concerns.    Diagnosis:    ICD-10-CM    1. Spasmodic torticollis  G24.3 Spine Referral     Discharge Medications:  New Prescriptions    CYCLOBENZAPRINE (FLEXERIL) 10 MG TABLET    Take 1 tablet (10 mg) by mouth 3 times daily as needed for muscle spasms     Scribe Disclosure:  I, Susan Murphy, am serving as a scribe at 8:43 PM on 6/5/2022 to document services personally performed by Morgan Snowden MD based on my observations and the provider's statements to me.      Morgan Snowden MD  06/05/22 [id number]

## 2022-06-06 NOTE — ED TRIAGE NOTES
Pt arrives complaining of neck pain and stiffness. Denies numbness or tingling. No change in sensation. No injury to back or neck. Pain for 2 days and worsening.   Has tried heat with no improvement. Last dose of tylenol at 6pm     Triage Assessment     Row Name 06/05/22 2001       Triage Assessment (Adult)    Airway WDL WDL       Respiratory WDL    Respiratory WDL WDL       Skin Circulation/Temperature WDL    Skin Circulation/Temperature WDL WDL       Cardiac WDL    Cardiac WDL WDL       Peripheral/Neurovascular WDL    Peripheral Neurovascular WDL WDL       Cognitive/Neuro/Behavioral WDL    Cognitive/Neuro/Behavioral WDL WDL

## 2022-09-13 ENCOUNTER — OFFICE VISIT (OUTPATIENT)
Dept: OBGYN | Facility: CLINIC | Age: 37
End: 2022-09-13
Payer: COMMERCIAL

## 2022-09-13 VITALS
HEIGHT: 64 IN | WEIGHT: 136 LBS | SYSTOLIC BLOOD PRESSURE: 106 MMHG | DIASTOLIC BLOOD PRESSURE: 78 MMHG | BODY MASS INDEX: 23.22 KG/M2

## 2022-09-13 DIAGNOSIS — N97.9 FEMALE INFERTILITY: Primary | ICD-10-CM

## 2022-09-13 DIAGNOSIS — O08.89 PARTIAL MOLAR PREGNANCY: ICD-10-CM

## 2022-09-13 PROCEDURE — 99215 OFFICE O/P EST HI 40 MIN: CPT | Performed by: OBSTETRICS & GYNECOLOGY

## 2022-09-13 RX ORDER — CLOMIPHENE CITRATE 50 MG/1
50 TABLET ORAL DAILY
Qty: 5 TABLET | Refills: 2 | Status: SHIPPED | OUTPATIENT
Start: 2022-09-13 | End: 2023-03-14

## 2022-09-13 NOTE — PROGRESS NOTES
"  SUBJECTIVE:                                                   CC:  Patient presents with:  Follow Up: Fertility, Molar 2022      HPI:  Carri Sauer is a 36 year old  who presents for infertility discussion.      Has a 11 yo daughter from a prior relationship. ( 2010 at 39+6)  With current partner Travis Escoto ( 3/20/82) she has had two pregnancies which have not ended in a live birth:  2021: SAB, s/p D&C, genotyping revealed partial mole and chromosomes 69 XXY (followed HCG to zero)  2022: early SAB    TTC since that time doing TIC     Cycles are q18-31 days, most are 24-25 day cycles.  She does home OPKs, gets a positive but sometimes (like on the 18 day cycles) she will get a pos on CD 4 or 5 before she has even stopped bleeding from her period.    FOB is Travis, he fathered the last two pregnancies of hers.  Has a PCP, but has never gotten a SA.     Gyn History:  Patient's last menstrual period was 2022 (exact date).       Using none for contraception.    Last 3 Pap and HPV Results:   PAP / HPV Latest Ref Rng & Units 3/5/2021   PAP (Historical) - NIL   HPV16 NEG:Negative Negative   HPV18 NEG:Negative Negative   HRHPV NEG:Negative Negative       PMH, PSH, Soc Hx, Fam Hx, Meds, and allergies reviewed in Epic.    OBJECTIVE:     /78 (BP Location: Right arm, Patient Position: Chair, Cuff Size: Adult Regular)   Ht 1.626 m (5' 4\")   Wt 61.7 kg (136 lb)   LMP 2022 (Exact Date)   Breastfeeding No   BMI 23.34 kg/m      Gen: Healthy appearing thin female, no acute distress, comfortable  Psych: mentation appears normal and affect bright, mildly appropriately anxious    ASSESSMENT/PLAN:                                                      1. Female infertility  Extensive discussion of physiology of pregnancy and different factors for infertility.  She is moderate risk of ovulation factor, moderate risk male factor, low risk tubal factor, low risk uterine " factor.  The work up for each of these was outlined in detail, including the possibilities to do step-wise testing starting with more likely factors, moving to the more invasive testing that is lower yield and possibly not covered by insurance.  Discussed that infertility of unknown etiology can be treated here with clomid (or femara), clomid (or femara) +IUI, or referral to infertility specialist.  She elects for clomid now, with possible [facility] referral if not pregnant within 3 cycles.  The following tests will be ordered today and she will follow up as needed.   - Mullerian Hormone Antibody; Future  - Progesterone; Standing  - TSH with free T4 reflex; Future  - clomiPHENE (CLOMID) 50 MG tablet; Take 1 tablet (50 mg) by mouth daily On cycle day 3-7.  DO NOT take if pregnant.  Recommend progesterone lab test one week after ovulation to confirm dosage.  Dispense: 5 tablet; Refill: 2    2. Partial molar pregnancy  In subsequent pregnancy, plan to obtain first trimester US, send placenta, take a HCG level 6 weeks postpartum.     Va Loaiza MD, MPH  Obstetrics and Gynecology      Total time spent was 42 minutes; this includes pre-work time, intra-service time, and post-work time including time spent on documentation which occurred on the date of service.

## 2022-10-15 ENCOUNTER — HEALTH MAINTENANCE LETTER (OUTPATIENT)
Age: 37
End: 2022-10-15

## 2022-10-17 ENCOUNTER — LAB (OUTPATIENT)
Dept: LAB | Facility: CLINIC | Age: 37
End: 2022-10-17
Payer: COMMERCIAL

## 2022-10-17 DIAGNOSIS — N97.9 FEMALE INFERTILITY: ICD-10-CM

## 2022-10-17 DIAGNOSIS — O08.89 PARTIAL MOLAR PREGNANCY: ICD-10-CM

## 2022-10-17 LAB
B-HCG SERPL-ACNC: <1 IU/L (ref 0–5)
MIS SERPL-MCNC: 0.17 NG/ML (ref 0.15–7.5)
PROGEST SERPL-MCNC: 16.2 NG/ML
TSH SERPL DL<=0.005 MIU/L-ACNC: 1.96 MU/L (ref 0.4–4)

## 2022-10-17 PROCEDURE — 36415 COLL VENOUS BLD VENIPUNCTURE: CPT

## 2022-10-17 PROCEDURE — 83520 IMMUNOASSAY QUANT NOS NONAB: CPT

## 2022-10-17 PROCEDURE — 84144 ASSAY OF PROGESTERONE: CPT

## 2022-10-17 PROCEDURE — 84443 ASSAY THYROID STIM HORMONE: CPT

## 2022-10-17 PROCEDURE — 84702 CHORIONIC GONADOTROPIN TEST: CPT

## 2022-10-26 ENCOUNTER — LAB (OUTPATIENT)
Dept: LAB | Facility: CLINIC | Age: 37
End: 2022-10-26
Payer: COMMERCIAL

## 2022-10-26 DIAGNOSIS — O08.89 PARTIAL MOLAR PREGNANCY: ICD-10-CM

## 2022-10-26 PROCEDURE — 84702 CHORIONIC GONADOTROPIN TEST: CPT

## 2022-10-26 PROCEDURE — 36415 COLL VENOUS BLD VENIPUNCTURE: CPT

## 2022-10-27 LAB — B-HCG SERPL-ACNC: 502 IU/L (ref 0–5)

## 2022-11-09 ENCOUNTER — LAB (OUTPATIENT)
Dept: LAB | Facility: CLINIC | Age: 37
End: 2022-11-09
Payer: COMMERCIAL

## 2022-11-09 DIAGNOSIS — O08.89 PARTIAL MOLAR PREGNANCY: ICD-10-CM

## 2022-11-09 DIAGNOSIS — N97.9 FEMALE INFERTILITY: ICD-10-CM

## 2022-11-09 LAB
B-HCG SERPL-ACNC: 3378 IU/L (ref 0–5)
PROGEST SERPL-MCNC: 27.9 NG/ML

## 2022-11-09 PROCEDURE — 84702 CHORIONIC GONADOTROPIN TEST: CPT

## 2022-11-09 PROCEDURE — 84144 ASSAY OF PROGESTERONE: CPT

## 2022-11-09 PROCEDURE — 36415 COLL VENOUS BLD VENIPUNCTURE: CPT

## 2022-11-11 ENCOUNTER — LAB (OUTPATIENT)
Dept: LAB | Facility: CLINIC | Age: 37
End: 2022-11-11
Payer: COMMERCIAL

## 2022-11-11 DIAGNOSIS — O08.89 PARTIAL MOLAR PREGNANCY: ICD-10-CM

## 2022-11-11 DIAGNOSIS — N97.9 FEMALE INFERTILITY: ICD-10-CM

## 2022-11-11 LAB
B-HCG SERPL-ACNC: 3714 IU/L (ref 0–5)
PROGEST SERPL-MCNC: 28.3 NG/ML

## 2022-11-11 PROCEDURE — 36415 COLL VENOUS BLD VENIPUNCTURE: CPT

## 2022-11-11 PROCEDURE — 84144 ASSAY OF PROGESTERONE: CPT

## 2022-11-11 PROCEDURE — 84702 CHORIONIC GONADOTROPIN TEST: CPT

## 2022-11-14 DIAGNOSIS — Z32.01 PREGNANCY TEST POSITIVE: Primary | ICD-10-CM

## 2022-11-15 ENCOUNTER — ANCILLARY PROCEDURE (OUTPATIENT)
Dept: ULTRASOUND IMAGING | Facility: CLINIC | Age: 37
End: 2022-11-15
Payer: COMMERCIAL

## 2022-11-15 DIAGNOSIS — Z32.01 PREGNANCY TEST POSITIVE: ICD-10-CM

## 2022-11-15 PROCEDURE — 76817 TRANSVAGINAL US OBSTETRIC: CPT | Performed by: OBSTETRICS & GYNECOLOGY

## 2022-11-15 PROCEDURE — 76801 OB US < 14 WKS SINGLE FETUS: CPT | Performed by: OBSTETRICS & GYNECOLOGY

## 2022-11-16 DIAGNOSIS — Z32.01 PREGNANCY TEST POSITIVE: Primary | ICD-10-CM

## 2022-11-22 ENCOUNTER — ALLIED HEALTH/NURSE VISIT (OUTPATIENT)
Dept: NURSING | Facility: CLINIC | Age: 37
End: 2022-11-22
Payer: COMMERCIAL

## 2022-11-22 DIAGNOSIS — O26.891 RH NEGATIVE STATUS DURING PREGNANCY IN FIRST TRIMESTER: ICD-10-CM

## 2022-11-22 DIAGNOSIS — Z67.91 RH NEGATIVE STATUS DURING PREGNANCY IN FIRST TRIMESTER: ICD-10-CM

## 2022-11-22 DIAGNOSIS — O20.9 VAGINAL BLEEDING AFFECTING EARLY PREGNANCY: Primary | ICD-10-CM

## 2022-11-22 PROCEDURE — 99207 PR NO CHARGE NURSE ONLY: CPT

## 2022-11-22 PROCEDURE — 96372 THER/PROPH/DIAG INJ SC/IM: CPT | Performed by: OBSTETRICS & GYNECOLOGY

## 2022-11-23 ENCOUNTER — TELEPHONE (OUTPATIENT)
Dept: OBGYN | Facility: CLINIC | Age: 37
End: 2022-11-23

## 2022-11-23 ENCOUNTER — ANCILLARY PROCEDURE (OUTPATIENT)
Dept: ULTRASOUND IMAGING | Facility: CLINIC | Age: 37
End: 2022-11-23
Attending: OBSTETRICS & GYNECOLOGY
Payer: COMMERCIAL

## 2022-11-23 DIAGNOSIS — O03.4 INEVITABLE ABORTION: Primary | ICD-10-CM

## 2022-11-23 DIAGNOSIS — Z32.01 PREGNANCY TEST POSITIVE: ICD-10-CM

## 2022-11-23 PROCEDURE — 76817 TRANSVAGINAL US OBSTETRIC: CPT | Performed by: OBSTETRICS & GYNECOLOGY

## 2022-11-23 PROCEDURE — 76801 OB US < 14 WKS SINGLE FETUS: CPT | Performed by: OBSTETRICS & GYNECOLOGY

## 2022-11-23 RX ORDER — MISOPROSTOL 200 UG/1
800 TABLET ORAL ONCE
Qty: 4 TABLET | Refills: 0 | Status: SHIPPED | OUTPATIENT
Start: 2022-11-23 | End: 2022-11-23

## 2022-11-23 NOTE — TELEPHONE ENCOUNTER
I spoke w/Pt at length about today's U/S findings showing an inevitable SAB.  Pt has some VB currently but not heavy.  I outlined options including expectant management for a short period of time (jabier given the pregnancy may have been non-viable for a week already based on prior U/S and HCGs), vs Cytotec vaginally vs Suction D&C.  Pt opts to pickup Cytotec Rx (800 mcg vaginally x 1 dose sent to pharm) to have on hand.  If SAB does not complete in next 24-48 hours, she will use the Cytotec.  VB, pain, fever precautions given.  If Cytotec fails to resolve SAB by Monday (and no severe VB, pain, fever necessitating ER visit by then), then Pt is to call clinic to schedule Suction D&C next week.

## 2022-12-02 ENCOUNTER — LAB (OUTPATIENT)
Dept: LAB | Facility: CLINIC | Age: 37
End: 2022-12-02
Payer: COMMERCIAL

## 2022-12-02 DIAGNOSIS — O03.4 INCOMPLETE MISCARRIAGE: ICD-10-CM

## 2022-12-02 LAB — HCG INTACT+B SERPL-ACNC: 22 MIU/ML

## 2022-12-02 PROCEDURE — 84702 CHORIONIC GONADOTROPIN TEST: CPT

## 2022-12-02 PROCEDURE — 36415 COLL VENOUS BLD VENIPUNCTURE: CPT

## 2022-12-09 ENCOUNTER — LAB (OUTPATIENT)
Dept: LAB | Facility: CLINIC | Age: 37
End: 2022-12-09
Payer: COMMERCIAL

## 2022-12-09 DIAGNOSIS — O03.4 INCOMPLETE MISCARRIAGE: ICD-10-CM

## 2022-12-09 LAB — HCG INTACT+B SERPL-ACNC: 3 MIU/ML

## 2022-12-09 PROCEDURE — 36415 COLL VENOUS BLD VENIPUNCTURE: CPT

## 2022-12-09 PROCEDURE — 84702 CHORIONIC GONADOTROPIN TEST: CPT

## 2023-01-30 ENCOUNTER — LAB (OUTPATIENT)
Dept: LAB | Facility: CLINIC | Age: 38
End: 2023-01-30
Payer: COMMERCIAL

## 2023-01-30 DIAGNOSIS — O03.4 INCOMPLETE MISCARRIAGE: ICD-10-CM

## 2023-01-30 LAB — HCG INTACT+B SERPL-ACNC: 9410 MIU/ML

## 2023-01-30 PROCEDURE — 84702 CHORIONIC GONADOTROPIN TEST: CPT

## 2023-01-30 PROCEDURE — 36415 COLL VENOUS BLD VENIPUNCTURE: CPT

## 2023-02-01 ENCOUNTER — LAB (OUTPATIENT)
Dept: LAB | Facility: CLINIC | Age: 38
End: 2023-02-01
Payer: COMMERCIAL

## 2023-02-01 DIAGNOSIS — O03.4 INCOMPLETE MISCARRIAGE: ICD-10-CM

## 2023-02-01 DIAGNOSIS — N97.9 FEMALE INFERTILITY: ICD-10-CM

## 2023-02-01 LAB
HCG INTACT+B SERPL-ACNC: ABNORMAL MIU/ML
PROGEST SERPL-MCNC: 15.2 NG/ML

## 2023-02-01 PROCEDURE — 84702 CHORIONIC GONADOTROPIN TEST: CPT

## 2023-02-01 PROCEDURE — 36415 COLL VENOUS BLD VENIPUNCTURE: CPT

## 2023-02-01 PROCEDURE — 84144 ASSAY OF PROGESTERONE: CPT

## 2023-02-02 DIAGNOSIS — Z32.01 PREGNANCY TEST POSITIVE: Primary | ICD-10-CM

## 2023-02-06 ENCOUNTER — ANCILLARY PROCEDURE (OUTPATIENT)
Dept: ULTRASOUND IMAGING | Facility: CLINIC | Age: 38
End: 2023-02-06
Payer: COMMERCIAL

## 2023-02-06 DIAGNOSIS — Z32.01 PREGNANCY TEST POSITIVE: ICD-10-CM

## 2023-02-06 PROCEDURE — 76817 TRANSVAGINAL US OBSTETRIC: CPT | Performed by: OBSTETRICS & GYNECOLOGY

## 2023-02-06 PROCEDURE — 76801 OB US < 14 WKS SINGLE FETUS: CPT | Performed by: OBSTETRICS & GYNECOLOGY

## 2023-02-14 ENCOUNTER — PRENATAL OFFICE VISIT (OUTPATIENT)
Dept: NURSING | Facility: CLINIC | Age: 38
End: 2023-02-14
Payer: COMMERCIAL

## 2023-02-14 DIAGNOSIS — O09.529 SUPERVISION OF HIGH-RISK PREGNANCY OF ELDERLY MULTIGRAVIDA: Primary | ICD-10-CM

## 2023-02-14 PROCEDURE — 99207 PR NO CHARGE NURSE ONLY: CPT

## 2023-02-14 RX ORDER — ACETAMINOPHEN 325 MG/1
325-650 TABLET ORAL EVERY 6 HOURS PRN
COMMUNITY

## 2023-02-14 NOTE — PROGRESS NOTES
NPN nurse visit done over the phone. Pt will be given NPN folder and book at her upcoming appt.   Discussed optional screening available to assess chromosomal anomalies. Questions answered. Pt advised to call the clinic if she has any questions or concerns related to her pregnancy. Prenatal labs will be obtained at her upcoming appt. New prenatal visit scheduled on 3/13 with Dr Prasad.    7w3d    Lab Results   Component Value Date    PAP NIL 03/05/2021           Patient supplied answers from flow sheet for:  Prenatal OB Questionnaire.  Past Medical History  Have you ever recieved care for your mental health? : No  Have you ever been in a major accident or suffered serious trauma?: No  Within the last year, has anyone hit, slapped, kicked or otherwise hurt you?: No  In the last year, has anyone forced you to have sex when you didn't want to?: No    Past Medical History 2   Have you ever received a blood transfusion?: No  Would you accept a blood transfusion if was medically recommended?: Yes  Does anyone in your home smoke?: No   Is your blood type Rh negative?: (!) Yes  Have you ever ?: (!) Yes  Have you been hospitalized for a nonsurgical reason excluding normal delivery?: No  Have you ever had an abnormal pap smear?: (!) Yes    Past Medical History (Continued)  Do you have a history of abnormalities of the uterus?: No  Did your mother take MALLIKA or any other hormones when she was pregnant with you?: No  Do you have any other problems we have not asked about which you feel may be important to this pregnancy?: No         Natividad Lackey RN

## 2023-02-19 LAB
ABO/RH(D): ABNORMAL
ANTIBODY SCREEN: POSITIVE
SPECIMEN EXPIRATION DATE: ABNORMAL

## 2023-02-20 ENCOUNTER — ANCILLARY PROCEDURE (OUTPATIENT)
Dept: ULTRASOUND IMAGING | Facility: CLINIC | Age: 38
End: 2023-02-20
Payer: COMMERCIAL

## 2023-02-20 ENCOUNTER — LAB (OUTPATIENT)
Dept: LAB | Facility: CLINIC | Age: 38
End: 2023-02-20
Payer: COMMERCIAL

## 2023-02-20 DIAGNOSIS — O09.529 SUPERVISION OF HIGH-RISK PREGNANCY OF ELDERLY MULTIGRAVIDA: ICD-10-CM

## 2023-02-20 LAB
ANTIBODY ID: NORMAL
ERYTHROCYTE [DISTWIDTH] IN BLOOD BY AUTOMATED COUNT: 11.6 % (ref 10–15)
HCT VFR BLD AUTO: 39.4 % (ref 35–47)
HGB BLD-MCNC: 13.9 G/DL (ref 11.7–15.7)
MCH RBC QN AUTO: 32.7 PG (ref 26.5–33)
MCHC RBC AUTO-ENTMCNC: 35.3 G/DL (ref 31.5–36.5)
MCV RBC AUTO: 93 FL (ref 78–100)
PLATELET # BLD AUTO: 198 10E3/UL (ref 150–450)
RBC # BLD AUTO: 4.25 10E6/UL (ref 3.8–5.2)
SPECIMEN EXPIRATION DATE: NORMAL
WBC # BLD AUTO: 6.5 10E3/UL (ref 4–11)

## 2023-02-20 PROCEDURE — 86803 HEPATITIS C AB TEST: CPT

## 2023-02-20 PROCEDURE — 86900 BLOOD TYPING SEROLOGIC ABO: CPT

## 2023-02-20 PROCEDURE — 87340 HEPATITIS B SURFACE AG IA: CPT

## 2023-02-20 PROCEDURE — 85027 COMPLETE CBC AUTOMATED: CPT

## 2023-02-20 PROCEDURE — 86780 TREPONEMA PALLIDUM: CPT

## 2023-02-20 PROCEDURE — 86901 BLOOD TYPING SEROLOGIC RH(D): CPT

## 2023-02-20 PROCEDURE — 36415 COLL VENOUS BLD VENIPUNCTURE: CPT

## 2023-02-20 PROCEDURE — 76801 OB US < 14 WKS SINGLE FETUS: CPT | Performed by: OBSTETRICS & GYNECOLOGY

## 2023-02-20 PROCEDURE — 86762 RUBELLA ANTIBODY: CPT

## 2023-02-20 PROCEDURE — 87389 HIV-1 AG W/HIV-1&-2 AB AG IA: CPT

## 2023-02-20 PROCEDURE — 86850 RBC ANTIBODY SCREEN: CPT

## 2023-02-20 PROCEDURE — 86870 RBC ANTIBODY IDENTIFICATION: CPT

## 2023-02-20 PROCEDURE — 87086 URINE CULTURE/COLONY COUNT: CPT

## 2023-02-21 LAB
HBV SURFACE AG SERPL QL IA: NONREACTIVE
HCV AB SERPL QL IA: NONREACTIVE
HIV 1+2 AB+HIV1 P24 AG SERPL QL IA: NONREACTIVE
RUBV IGG SERPL QL IA: 11.6 INDEX
RUBV IGG SERPL QL IA: POSITIVE
T PALLIDUM AB SER QL: NONREACTIVE

## 2023-02-22 LAB — BACTERIA UR CULT: NO GROWTH

## 2023-03-14 ENCOUNTER — PRENATAL OFFICE VISIT (OUTPATIENT)
Dept: OBGYN | Facility: CLINIC | Age: 38
End: 2023-03-14
Payer: COMMERCIAL

## 2023-03-14 VITALS
SYSTOLIC BLOOD PRESSURE: 120 MMHG | HEIGHT: 64 IN | WEIGHT: 132 LBS | BODY MASS INDEX: 22.53 KG/M2 | DIASTOLIC BLOOD PRESSURE: 60 MMHG

## 2023-03-14 DIAGNOSIS — O09.529 SUPERVISION OF HIGH-RISK PREGNANCY OF ELDERLY MULTIGRAVIDA: Primary | ICD-10-CM

## 2023-03-14 PROCEDURE — 99207 PR FIRST OB VISIT: CPT | Performed by: OBSTETRICS & GYNECOLOGY

## 2023-03-14 RX ORDER — ASPIRIN 81 MG/1
81 TABLET, CHEWABLE ORAL DAILY
Qty: 90 TABLET | Refills: 3 | Status: SHIPPED | OUTPATIENT
Start: 2023-03-14 | End: 2023-04-25

## 2023-03-15 ENCOUNTER — TRANSCRIBE ORDERS (OUTPATIENT)
Dept: MATERNAL FETAL MEDICINE | Facility: CLINIC | Age: 38
End: 2023-03-15
Payer: COMMERCIAL

## 2023-03-15 DIAGNOSIS — O26.90 PREGNANCY RELATED CONDITION, ANTEPARTUM: Primary | ICD-10-CM

## 2023-03-15 NOTE — PROGRESS NOTES
"Carri is a 37 year old  at 11w3d here for new ob visit.      FOB Travis.  H/o molar pregnancy 2021  SAB 2022  Had a referral to [facility] but spontaneously conceived just before  +nausea, anxious  Rh neg    OB History    Para Term  AB Living   4 1 1 0 2 1   SAB IAB Ectopic Multiple Live Births   1 0 0 0 1      # Outcome Date GA Lbr Dale/2nd Weight Sex Delivery Anes PTL Lv   4 Current            3 Term 06/01/10 39w6d  3.175 kg (7 lb) F Vag-Spont EPI N KIARRA      Complications: Hemorrhage      Name: Hortencia   2 SAB            1 Molar               Obstetric Comments   Partial mole 2021, 70 XXY   SAB 2022       ROS: Ten point review of systems was reviewed and negative except the above.    Past Medical History:   Diagnosis Date     ASCUS with positive high risk HPV cervical 2010 ASCUS, +HR HPV (care everywhere)     Past Surgical History:   Procedure Laterality Date     IA BREAST AUGMENTATION Bilateral      Patient Active Problem List    Diagnosis Date Noted     Rh negative state in antepartum period 2021     Priority: Medium     Incomplete miscarriage 2021     Priority: Medium     Supervision of normal pregnancy 2021     Priority: Medium     ASCUS with positive high risk HPV cervical 2010     Priority: Medium     2010 ASCUS, +HR HPV @23 yrs old (care everywhere) [place] recommended. Unclear if patient completed as advised  2012 NIL (care everywhere)  2013 NIL (care everywhere)  3/5/2021 NIL pap, neg HR HPV. Plan cotest in 3 years            No Known Allergies  Prenatal Vit-Fe Fumarate-FA (PRENATAL VITAMIN) 27-0.8 MG TABS,   acetaminophen (TYLENOL) 325 MG tablet, Take 325-650 mg by mouth every 6 hours as needed for mild pain (Patient not taking: Reported on 3/14/2023)    No current facility-administered medications on file prior to visit.      Physical Exam:   /60   Ht 1.626 m (5' 4\")   Wt 59.9 kg (132 lb)   LMP " 2022   BMI 22.66 kg/m      Gen:  no acute distress, comfortable, smiling  HENT: No scleral injection or icterus  CV: Regular rate and rhythm, no m/g/r  Resp: Normal work of breathing, no cough  GI: Abdomen soft, non-tender. No masses, organomegaly  Skin: No suspicious lesions or rashes  Psychiatric: mentation appears normal and affect bright    Doptones 150s  FH cwd    Lab Results   Component Value Date    ABO O 2021    RH Neg 2021       A/P 37 year old  at 11w3d here for NOB visit.  - Discussed physician coverage, tertiary support, diet, exercise, weight gain, schedule of visits, routine and indicated ultrasounds, and childbirth education.  Discussed MFM coverage and reviewed options for  testing in the first trimester.  Recommended PNV.  NOB labs and US reviewed.       Concerns:  - Oneg/RI.  FOB Travis.  Declines NIPT. FOB Travis.  - H/o molar pregnancy 2021 - plan to send placenta postpartum, and obtain HCG at 6w PP visit  - reviewed palliative measures for nausea  - rh neg, plan rhogam at 28 wks  - Pt recommended for 81 mg baby ASA for pre-eclampsia prevention based of risk factors.  - AMA, plan level 2 US    RTC 4 weeks    Va Loaiza MD, MPH  [facility]

## 2023-04-24 ENCOUNTER — PRE VISIT (OUTPATIENT)
Dept: MATERNAL FETAL MEDICINE | Facility: CLINIC | Age: 38
End: 2023-04-24
Payer: COMMERCIAL

## 2023-04-25 ENCOUNTER — PRENATAL OFFICE VISIT (OUTPATIENT)
Dept: OBGYN | Facility: CLINIC | Age: 38
End: 2023-04-25
Payer: COMMERCIAL

## 2023-04-25 VITALS
HEIGHT: 64 IN | DIASTOLIC BLOOD PRESSURE: 60 MMHG | WEIGHT: 140 LBS | BODY MASS INDEX: 23.9 KG/M2 | SYSTOLIC BLOOD PRESSURE: 98 MMHG

## 2023-04-25 DIAGNOSIS — Z34.80 SUPERVISION OF OTHER NORMAL PREGNANCY, ANTEPARTUM: Primary | ICD-10-CM

## 2023-04-25 PROCEDURE — 99207 PR PRENATAL VISIT: CPT | Performed by: OBSTETRICS & GYNECOLOGY

## 2023-04-25 NOTE — NURSING NOTE
"Chief Complaint   Patient presents with     Prenatal Care     17 3/7 weeks       Initial BP 98/60 (BP Location: Left arm, Patient Position: Chair, Cuff Size: Adult Regular)   Ht 1.626 m (5' 4\")   Wt 63.5 kg (140 lb)   LMP 2022   Breastfeeding No   BMI 24.03 kg/m   Estimated body mass index is 24.03 kg/m  as calculated from the following:    Height as of this encounter: 1.626 m (5' 4\").    Weight as of this encounter: 63.5 kg (140 lb).  BP completed using cuff size: regular    Questioned patient about current smoking habits.  Pt. has never smoked.          The following HM Due: NONE    +flutters    Ivory Cadena CMA    "

## 2023-04-25 NOTE — PROGRESS NOTES
37 year old  at 17w3d     - Oneg/RI.  FOB Travis.  Declines NIPT, declines MSAFP. FOB Travis.  - H/o molar pregnancy 2021 - plan to send placenta postpartum, and obtain HCG at 6w PP visit  - rh neg, plan rhogam at 28 wks  - on baby ASA  - AMA, has level 2 US on 5/2    RTC 4 wks     Va Loaiza MD, MPH  [facility]

## 2023-05-02 ENCOUNTER — HOSPITAL ENCOUNTER (OUTPATIENT)
Dept: ULTRASOUND IMAGING | Facility: CLINIC | Age: 38
Discharge: HOME | End: 2023-05-02
Attending: OBSTETRICS & GYNECOLOGY
Payer: COMMERCIAL

## 2023-05-02 ENCOUNTER — OFFICE VISIT (OUTPATIENT)
Dept: MATERNAL FETAL MEDICINE | Facility: CLINIC | Age: 38
End: 2023-05-02
Attending: OBSTETRICS & GYNECOLOGY
Payer: COMMERCIAL

## 2023-05-02 DIAGNOSIS — O26.90 PREGNANCY RELATED CONDITION, ANTEPARTUM: ICD-10-CM

## 2023-05-02 DIAGNOSIS — O09.522 MULTIGRAVIDA OF ADVANCED MATERNAL AGE IN SECOND TRIMESTER: Primary | ICD-10-CM

## 2023-05-02 DIAGNOSIS — O35.EXX0 PYELECTASIS OF FETUS ON PRENATAL ULTRASOUND: ICD-10-CM

## 2023-05-02 PROCEDURE — 76811 OB US DETAILED SNGL FETUS: CPT

## 2023-05-02 PROCEDURE — 76811 OB US DETAILED SNGL FETUS: CPT | Mod: 26 | Performed by: OBSTETRICS & GYNECOLOGY

## 2023-05-02 NOTE — NURSING NOTE
Patient presents to M for L2. Positive fetal movement. Denies LOF, vaginal bleeding or cramping/contractions. SBAR given to MFERICA MD, see their note in Epic.

## 2023-05-02 NOTE — PROGRESS NOTES
"Please see \"Imaging\" tab under \"Chart Review\" for details of today's US at the [facility].    Davy Vargas MD  Maternal-Fetal Medicine    "

## 2023-05-23 ENCOUNTER — PRENATAL OFFICE VISIT (OUTPATIENT)
Dept: OBGYN | Facility: CLINIC | Age: 38
End: 2023-05-23
Payer: COMMERCIAL

## 2023-05-23 VITALS — DIASTOLIC BLOOD PRESSURE: 70 MMHG | SYSTOLIC BLOOD PRESSURE: 100 MMHG

## 2023-05-23 DIAGNOSIS — Z34.80 SUPERVISION OF OTHER NORMAL PREGNANCY, ANTEPARTUM: Primary | ICD-10-CM

## 2023-05-23 PROCEDURE — 99207 PR PRENATAL VISIT: CPT | Performed by: OBSTETRICS & GYNECOLOGY

## 2023-05-23 NOTE — PROGRESS NOTES
37 year old  at 21w3d     - Oneg/RI.  FOB Travis.  Declines NIPT, declines MSAFP. FOB Travis.  - H/o molar pregnancy 2021 - plan to send placenta postpartum, and obtain HCG at 6w PP visit  - rh neg, plan rhogam at 28 wks  - on baby ASA  - AMA, level 2 w bilateral UTDA1, plan follow-up at 26 wks  - open to NIPT testing with next blood draw due to slightly higher risk of aneuploidy at her next visit with the GCT (needle phobia)    RTC 4 wks     Va Loaiza MD, MPH  [facility]

## 2023-05-23 NOTE — NURSING NOTE
"Chief Complaint   Patient presents with     Prenatal Care     21 3/7 weeks       Initial /70 (BP Location: Right arm, Patient Position: Chair, Cuff Size: Adult Regular)   LMP 2022   Breastfeeding No  Estimated body mass index is 24.03 kg/m  as calculated from the following:    Height as of 23: 1.626 m (5' 4\").    Weight as of 23: 63.5 kg (140 lb).  BP completed using cuff size: regular    Questioned patient about current smoking habits.  Pt. has never smoked.          The following HM Due: NONE    +fetal movement  -swelling    Ivory Cadena, CMA    "

## 2023-05-27 ENCOUNTER — HEALTH MAINTENANCE LETTER (OUTPATIENT)
Age: 38
End: 2023-05-27

## 2023-06-13 ENCOUNTER — HOSPITAL ENCOUNTER (OUTPATIENT)
Dept: ULTRASOUND IMAGING | Facility: CLINIC | Age: 38
Discharge: HOME | End: 2023-06-13
Attending: OBSTETRICS & GYNECOLOGY
Payer: COMMERCIAL

## 2023-06-13 ENCOUNTER — OFFICE VISIT (OUTPATIENT)
Dept: MATERNAL FETAL MEDICINE | Facility: CLINIC | Age: 38
End: 2023-06-13
Attending: OBSTETRICS & GYNECOLOGY
Payer: COMMERCIAL

## 2023-06-13 DIAGNOSIS — O35.EXX0 PYELECTASIS OF FETUS ON PRENATAL ULTRASOUND: Primary | ICD-10-CM

## 2023-06-13 DIAGNOSIS — O35.EXX0 PYELECTASIS OF FETUS ON PRENATAL ULTRASOUND: ICD-10-CM

## 2023-06-13 PROCEDURE — 76816 OB US FOLLOW-UP PER FETUS: CPT

## 2023-06-13 PROCEDURE — 76816 OB US FOLLOW-UP PER FETUS: CPT | Mod: 26 | Performed by: OBSTETRICS & GYNECOLOGY

## 2023-06-13 NOTE — PROGRESS NOTES
"Please see \"Imaging\" tab under \"Chart Review\" for details of today's US at the [facility].    Davy Vargas MD  Maternal-Fetal Medicine    "

## 2023-06-19 ENCOUNTER — PRENATAL OFFICE VISIT (OUTPATIENT)
Dept: OBGYN | Facility: CLINIC | Age: 38
End: 2023-06-19
Payer: COMMERCIAL

## 2023-06-19 VITALS
HEIGHT: 64 IN | WEIGHT: 154 LBS | DIASTOLIC BLOOD PRESSURE: 60 MMHG | SYSTOLIC BLOOD PRESSURE: 100 MMHG | BODY MASS INDEX: 26.29 KG/M2

## 2023-06-19 DIAGNOSIS — O09.529 SUPERVISION OF HIGH-RISK PREGNANCY OF ELDERLY MULTIGRAVIDA: Primary | ICD-10-CM

## 2023-06-19 DIAGNOSIS — O26.899 RH NEGATIVE STATE IN ANTEPARTUM PERIOD: ICD-10-CM

## 2023-06-19 DIAGNOSIS — Z34.80 SUPERVISION OF OTHER NORMAL PREGNANCY, ANTEPARTUM: ICD-10-CM

## 2023-06-19 DIAGNOSIS — Z67.91 RH NEGATIVE STATE IN ANTEPARTUM PERIOD: ICD-10-CM

## 2023-06-19 LAB
ERYTHROCYTE [DISTWIDTH] IN BLOOD BY AUTOMATED COUNT: 11.4 % (ref 10–15)
GLUCOSE 1H P 50 G GLC PO SERPL-MCNC: 76 MG/DL (ref 70–129)
HCT VFR BLD AUTO: 30.2 % (ref 35–47)
HGB BLD-MCNC: 10.3 G/DL (ref 11.7–15.7)
MCH RBC QN AUTO: 32.3 PG (ref 26.5–33)
MCHC RBC AUTO-ENTMCNC: 34.1 G/DL (ref 31.5–36.5)
MCV RBC AUTO: 95 FL (ref 78–100)
PLATELET # BLD AUTO: 215 10E3/UL (ref 150–450)
RBC # BLD AUTO: 3.19 10E6/UL (ref 3.8–5.2)
WBC # BLD AUTO: 7.3 10E3/UL (ref 4–11)

## 2023-06-19 PROCEDURE — 36415 COLL VENOUS BLD VENIPUNCTURE: CPT | Performed by: OBSTETRICS & GYNECOLOGY

## 2023-06-19 PROCEDURE — 99207 PR PRENATAL VISIT: CPT | Performed by: OBSTETRICS & GYNECOLOGY

## 2023-06-19 PROCEDURE — 86780 TREPONEMA PALLIDUM: CPT | Performed by: OBSTETRICS & GYNECOLOGY

## 2023-06-19 PROCEDURE — 82950 GLUCOSE TEST: CPT | Performed by: OBSTETRICS & GYNECOLOGY

## 2023-06-19 PROCEDURE — 85027 COMPLETE CBC AUTOMATED: CPT | Performed by: OBSTETRICS & GYNECOLOGY

## 2023-06-19 NOTE — PROGRESS NOTES
37 year old  at 25w2d     - Oneg/RI. FOB Travis.  - H/o molar pregnancy 2021 - plan to send placenta postpartum, and obtain HCG at 6w PP visit  - rh neg, plan rhogam at 28 wks  - on baby ASA  - AMA, level 2 w bilateral UTDA2-3, plan follow-up at 28 wks  - GCT and NIPT today    RTC 4 wks     Va Loaiza MD, MPH  [facility]

## 2023-06-19 NOTE — NURSING NOTE
"Chief Complaint   Patient presents with     Prenatal Care     25 2/7 weeks       Initial /60 (BP Location: Left arm, Patient Position: Chair, Cuff Size: Adult Regular)   Ht 1.626 m (5' 4\")   Wt 69.9 kg (154 lb)   LMP 2022   Breastfeeding No   BMI 26.43 kg/m   Estimated body mass index is 26.43 kg/m  as calculated from the following:    Height as of this encounter: 1.626 m (5' 4\").    Weight as of this encounter: 69.9 kg (154 lb).  BP completed using cuff size: regular    Questioned patient about current smoking habits.  Pt. has never smoked.          The following HM Due: NONE    +fetal movement  -swelling    Ivory Cadena, CMA    "

## 2023-06-20 LAB — T PALLIDUM AB SER QL: NONREACTIVE

## 2023-06-26 LAB — SCANNED LAB RESULT: ABNORMAL

## 2023-06-29 ENCOUNTER — TELEPHONE (OUTPATIENT)
Dept: MATERNAL FETAL MEDICINE | Facility: CLINIC | Age: 38
End: 2023-06-29
Payer: COMMERCIAL

## 2023-06-29 ENCOUNTER — TRANSCRIBE ORDERS (OUTPATIENT)
Dept: MATERNAL FETAL MEDICINE | Facility: CLINIC | Age: 38
End: 2023-06-29
Payer: COMMERCIAL

## 2023-06-29 DIAGNOSIS — O28.5 ABNORMAL CHROMOSOMAL AND GENETIC FINDING ON ANTENATAL SCREENING MOTHER: Primary | ICD-10-CM

## 2023-06-29 DIAGNOSIS — O26.90 PREGNANCY RELATED CONDITION, ANTEPARTUM: Primary | ICD-10-CM

## 2023-06-29 NOTE — TELEPHONE ENCOUNTER
June 29, 2023    Left  for Kia regarding referral received from Dr. Loaiza regarding her positive NIPT for trisomy 21. Based on documentation from Dr. Loaiza, appears that patient may consider amniocentesis and may even consider [place]. Scheduling spoke with Kia this morning and she declined moving her appointments to today.     Encouraged her to call me to discuss her result in more detail and to review options going forward. Will await call/attempt to reach her again later today.     Susie Salcido MS, [facility]  Licensed Genetic Counselor  [facility]  Maternal Fetal Medicine  [email or web address]  [phone]

## 2023-06-29 NOTE — TELEPHONE ENCOUNTER
"June 29, 2023    Kia returned my call to discuss next steps.     She shared that at this time, she is leaning away from amniocentesis as she has an intense phobia of needles. I let her know that diagnostic testing is optional and in no way required. We discussed that we will  based on her positive NIPT alone (such as having a fetal echocardiogram and growth scans). Kia also shared that after discussion with her partner, they would not consider a later termination and really feel that they just want to start preparing for their son's delivery. Although she was tearful on the phone, she also expressed relief at having some answers and feels that her anxiety has actually been reduced. She and her partner had been reading a lot about UTD and about ultrasound in general, specifically detection rates for Down syndrome (~60% with level II ultrasounds). Kia stated that they had started to focus so much on the \"what-ifs\" that having a likely answer feels better.     Kia had questions about outcomes and whether or not we might be able to predict severity. We discussed that some major structural differences (like heart defects) may be more commonly associated with severe outcomes, partially due to NICU stay and surgical repairs. For a fetus that has more mild findings, there is not way to truly predict outcomes. Kia is specifically wondering about intellectual disability and autism. We discussed that most children with Down syndrome have some level of intellectual disability, though this tends to be on the mild to moderate side. Severe intellectual disability is relatively rare. Down syndrome does increase the chance for autism, though this chance is possibly around 15-20%. I encouraged her to discuss further with the genetic counselor tomorrow.     Plan for Kia to schedule a fetal echocardiogram with [facility] after her GC visit tomorrow. Okay to leave follow-up level II as scheduled per  " Alicia.     Susie Salcido MS, [facility]  Licensed Genetic Counselor  [facility]  Maternal Fetal Medicine  [email or web address]  [phone]

## 2023-06-30 ENCOUNTER — OFFICE VISIT (OUTPATIENT)
Dept: MATERNAL FETAL MEDICINE | Facility: CLINIC | Age: 38
End: 2023-06-30
Attending: OBSTETRICS & GYNECOLOGY
Payer: COMMERCIAL

## 2023-06-30 DIAGNOSIS — O26.90 PREGNANCY RELATED CONDITION, ANTEPARTUM: ICD-10-CM

## 2023-06-30 PROCEDURE — 999N000069 HC STATISTIC GENETIC COUNSELING, < 16 MIN

## 2023-06-30 NOTE — PROGRESS NOTES
"[facility]  Genetic Counseling Consult    Patient:  Carri Sauer YOB: 1985   Date of Service:  23   MRN: [id number]    Carri \"Robin" was seen at the [facility] for genetic consultation. The indication for genetic counseling is positive or abnormal genetic screening results. The patient was accompanied to this visit by their , Travis.    The session was conducted in English.      IMPRESSION/ PLAN   1. Kia had genetic screening earlier in this pregnancy. Their non-invasive prenatal test was screen positive or high risk for trisomy 21 and low risk for the other screened conditions. The purpose of today's visit with genetic counseling was to discuss this result, as well as address any questions that she or Travis had about trisomy 21. Please see details of the result and our discussion today below.    2. During today's [facility] visit, Kia had a genetic counseling session only. Kia has already had genetic testing in this pregnancy. Additional screening and diagnostic testing was discussed for the gestational age and declined.    3. Jaswant did not have an ultrasound today.     4. Kia is scheduled for an ultrasound on 2023 and a fetal echocardiogram on 2023..    PREGNANCY HISTORY   /Parity:       Charlee pregnancy history is significant for:     2010: , 39w6, female    2021: partial molar pregnancy (69,XXY) SAB    2022: SAB    CURRENT PREGNANCY   Current Age: 37 year old   Age at Delivery: 38 year old  SMOOTH: 2023, by Last Menstrual Period                                   Gestational Age: 26w6d  This pregnancy is a single gestation.   This pregnancy was conceived spontaneously.    MEDICAL HISTORY   Carri holden reported medical history is not expected to impact pregnancy management or risks to fetal development.       FAMILY HISTORY   A three-generation " pedigree was not obtained today due to our focus on other topics.        DISCUSSION OF POSITIVE NON-INVASIVE PRENATAL SCREENING   We explained that the risk for fetal chromosome abnormalities increases with maternal age. We discussed specific features of Down.     First trimester ultrasound with nuchal translucency and nasal bone assessment was not performed in this pregnancy, to our knowledge. Kia had genetic screening upon identification and persistence of mild dilation of the renal pelvis without dilation of the calyces bilaterally. For a complete report on this, please view ultrasound reports from Dr. Alonso Vargas (06/13/2023). Their non-invasive prenatal test was screen positive or high risk for trisomy 21 and low risk for the other screened conditions. [facility] laboratory provides a positive predictive value of 79.3%.     Kia and Travis are grappling with this positive screening result. The news was very unexpected, and the discussions around it have progressed very quickly in the last week. Kia has been doing much of her own Internet research, which has provided information as well as additional anxiety. We spent the first portion of our discussion talking about the NIPT methodology, risk calculation, and its limitations, which I will discuss below.    1. NIPT Methodology    [facility] NIPT is performed using a NGS-based platform, which involves amplifying and counting the cell-free DNA fragments using millions of sequence tags per sample. Targeted regions are amplified and DNA from the pregnant person and from the placenta are sequenced simultaneously. In general, they cannot strictly differentiate between placental and maternal cell-free DNA, though the fragment sizes are often smaller for the placental DNA. The lab then analyzes the amplified, sequenced regions to determine if there is an over- or under-representation of chromosome material compared to the expected amount.    2. NIPT Risk  Calculation    Given this methodology, NIPT is a screening, not a diagnostic, test. When there is an over-representation of chromosome material compared to the expected amount, multiple factors are used to generate the positive predictive value (PPV). The positive predictive value is calculated using three main statistics: the sensitivity of the test, the specificity of the test, and the prevalence of the condition at the specific maternal age. Sensitivity a test metric (not specific to a particular sample), which describes the ability of a test to accurately identify affected pregnancies; sensitivity is also a test metric, which instead describes the ability of a test to accurately identify unaffected pregnancies. The prevalence of trisomy 21 increases with increasing maternal age. Thus, the PPV is a reflection of both age-related risks and test performance specifications.     We spent time discussing the implication of a 79.3% PPV, indicating 79.3% chance the pregnancy is affected by trisomy 21 and a 20.7% chance the result is a false positive and the pregnancy is not affected by trisomy 21. Renal pelvis dilation can occur in isolation, but is also associated with a slightly increased risk for trisomy 21, thus contributing to suspicion that the pregnancy may be affected. When diagnostic testing is not pursued and there is a positive NIPT result, we treat pregnancy management as if the pregnancy were affected.     3. NIPT Limitations    Travis Adam, and ADONAY talked about the fact that a major limitation of NIPT is that the DNA analyzed is placental in origin, rather than fetal. Thus, a major source of false positive results with respect to the fetus is the presence of something called confined placental mosaicism. We reviewed all the possible interpretations of a positive NIPT result:      The fetus may be mosaic for trisomy 21 (fetal mosaicism) and has one cell line with a normal karyotype and one cell line with  trisomy 21    Both the fetus and the placenta have two cell lines, one with a normal karyotype and one with trisomy 21 (generalized mosaicism)    An abnormal cell line with trisomy 21 may be confined to the placenta only (confined placental mosaicism). If this is true, there is an increased risk for IUGR. For trisomy 21, there is a relatively low chance for confined placental mosaicism (~2%).    The NIPT result could represent a partial aneuploidy (duplication of part of chromosome 21) that may be could be confined to the placenta or present in the fetus. Since many NIPT labs will comment on suspicion for partial aneuploidy, this explanation is less likely.    Neither the fetus nor the placenta have an abnormal cell line (a false positive).    We also discussed that maternal factors, such as autoimmune conditions and obesity, make it more likely for the test to fail (not be able to be performed), rather than contribute to a false positive result.    The remainder of our time was spent discussing the difficulty of receiving this result and providing support. Kia stated that she wished she had never pursued NIPT in the first place given the amount of distress it has caused. She also shared how her past pregnancy experiences were impacting the current pregnancy.She shared how confusing it was that her previous pregnancies resulted in losses, but this pregnancy, in which she sick for many weeks (COVID, morning sickness), the pregnancy continued. I explored those feelings of frustration with her.    We also talked about the ways in which Kia and Travis are seeking support. Kia has identified websites in which other pregnant individuals share their experiences with prenatal screening for chromosomal conditions. She talked about how their experiences sounded very different than hers. I provided support around the experience of isolation reading others' stories. I normalized her feelings and explored other  opportunities for connection/support.     I offered for Kia and Travis to speak with Tracey Echeverria NP, who is a provider in the pediatrics Down Syndrome clinic here at [facility]. They declined at this time given the overload of information from this week. I also offered to connect Kia with behavioral health clinician, Tanvi Garsia, which she also declined at this time.     Finally, I provided Kia and Travis with three written resources about trisomy 21:    Srinivasa Art SRA. Down Syndrome. [Updated 2022 Sep 6]. In: Ninjathat [Internet]. [place]): [employer]; 2023-. Available from: https://www.ncbi.nlm.nih.gov/books/BNN690087/    Miguel Sargent Goldstein R. Having a son or daughter with Down syndrome: perspectives from mothers and fathers. Am J Med Stacy A. 2011 Oct;155A(10):2335-47. doi: 10.1002/ajmg.a.16170. Epub 2011 Sep 13. Erratum in: Am J Med Stacy A. 2017 May;173(5):1453. PMID: 59008824; PMCID: LNK3154356.    Miguel Sargent Goldstein R. Self-perceptions from people with Down syndrome. Am J Med Stacy A. 2011 Oct;155A(10):2360-9. doi: 10.1002/ajmg.a.51695. Epub 2011 Sep 9. PMID: 77939168; PMCID: LHL4500836.    GENETIC TESTING OPTIONS FOR CHROMOSOMAL CONDITIONS   Kia is not interested in antepartum diagnostic testing. Kia had questions about pregnancy management moving forward, and we discussed the recommendation of a fetal echocardiogram, and likely, additional growth ultrasounds. I encouraged her to ask these questions at her upcoming [facility] ultrasound appointment on 2023.    We discussed post- testing via umbilical cord blood. If Kia's delivery is at [facility]'s [place] location, I would be happy to assist with coordinating the testing. If, however, her delivery is elsewhere, her primary OB will be coordinating that testing.     It was a pleasure to be involved with Kia s care. I encouraged her to reach out to me with any  additional questions. I will inquire about the location of her delivery to determine if my assistance coordinating cord blood genetic testing is needed.    Face-to-face time of the meeting was 60 minutes.    Christos Lynn GC, MS  [facility]  Maternal Fetal Medicine  Office: [phone]  [facility]: [phone]   Fax: [phone]  [facility]    Patient seen, evaluated and discussed with the Genetic Counseling Intern. I have verified the content of the note, which accurately reflects my assessment of the patient and the plan of care.  Supervising Genetic Counselor  Casie Turner MS, [facility]  Licensed Genetic Counselor   [facility]  Maternal Fetal Medicine  [email or web address]  [email or web address]  Office: [phone]  Pager [phone]  [facility]: [phone]   Fax: [phone]

## 2023-07-11 ENCOUNTER — TELEPHONE (OUTPATIENT)
Dept: OBGYN | Facility: CLINIC | Age: 38
End: 2023-07-11

## 2023-07-11 ENCOUNTER — OFFICE VISIT (OUTPATIENT)
Dept: MATERNAL FETAL MEDICINE | Facility: CLINIC | Age: 38
End: 2023-07-11
Attending: OBSTETRICS & GYNECOLOGY
Payer: COMMERCIAL

## 2023-07-11 ENCOUNTER — HOSPITAL ENCOUNTER (OUTPATIENT)
Dept: ULTRASOUND IMAGING | Facility: CLINIC | Age: 38
Discharge: HOME | End: 2023-07-11
Attending: OBSTETRICS & GYNECOLOGY
Payer: COMMERCIAL

## 2023-07-11 DIAGNOSIS — O28.5 ABNORMAL CHROMOSOMAL AND GENETIC FINDING ON ANTENATAL SCREENING MOTHER: Primary | ICD-10-CM

## 2023-07-11 DIAGNOSIS — O35.EXX0 PYELECTASIS OF FETUS ON PRENATAL ULTRASOUND: ICD-10-CM

## 2023-07-11 PROCEDURE — 76816 OB US FOLLOW-UP PER FETUS: CPT

## 2023-07-11 PROCEDURE — 76816 OB US FOLLOW-UP PER FETUS: CPT | Mod: 26 | Performed by: OBSTETRICS & GYNECOLOGY

## 2023-07-11 NOTE — TELEPHONE ENCOUNTER
----- Message from Va Loaiza MD sent at 7/10/2023  8:37 AM CDT -----  Regarding: FW: Action Required - [facility] Auth Needed - [facility] [id number]  Hi RNs,    Could you look into this?  Seems like a crazy thing we'd need to authorize ordering this test.      Thanks.    ----- Message -----  From: Jasmine Martinez (Irma)  Sent: 7/10/2023   7:56 AM CDT  To: Tiffany Garcia; Va Loaiza MD  Subject: Action Required - [facility] Auth Needed - [facility] #    Hello,    Per the reference lab:    We are working on the authorization for R.B., 1985 and need you to request the authorization for this ordered test with [facility].     Please submit the authorization request online to [facility] at [email or web address] or by calling [facility] directly at [phone] between 7am-7pm [facility].     Please choose [facility] as the servicing provider LabTax ID is [id number] and address is [address] [place] [address].       The Collection Date is 06/19/2023     The CPT code is 90069     The Test Code is NIPS     Please let me know if you have any other questions or concerns.     Thank you,     Usha BhagatZoweeTVaugustine Hernandez and Adalgisa  P: [phone]  F: [phone]

## 2023-07-11 NOTE — TELEPHONE ENCOUNTER
I called and spoke with Carlos at [facility].     Info was given to her and the approval auth for the NIPS is [id number]. It is valid from 6/19-9/17/23.    Tootie JOSE RN

## 2023-07-11 NOTE — PROGRESS NOTES
"Please see \"Imaging\" tab under \"Chart Review\" for details of today's US at the [facility].    Davy Vargas MD  Maternal-Fetal Medicine    "

## 2023-07-12 ENCOUNTER — OFFICE VISIT (OUTPATIENT)
Dept: PEDIATRIC CARDIOLOGY | Facility: CLINIC | Age: 38
End: 2023-07-12
Payer: COMMERCIAL

## 2023-07-12 ENCOUNTER — HOSPITAL ENCOUNTER (OUTPATIENT)
Dept: CARDIOLOGY | Facility: CLINIC | Age: 38
Discharge: HOME | End: 2023-07-12
Attending: OBSTETRICS & GYNECOLOGY | Admitting: OBSTETRICS & GYNECOLOGY
Payer: COMMERCIAL

## 2023-07-12 DIAGNOSIS — O35.BXX0 ANOMALY OF HEART OF FETUS AFFECTING PREGNANCY, ANTEPARTUM, SINGLE OR UNSPECIFIED FETUS: Primary | ICD-10-CM

## 2023-07-12 DIAGNOSIS — O28.5 ABNORMAL CHROMOSOMAL AND GENETIC FINDING ON ANTENATAL SCREENING MOTHER: ICD-10-CM

## 2023-07-12 PROCEDURE — 76825 ECHO EXAM OF FETAL HEART: CPT

## 2023-07-12 PROCEDURE — 93325 DOPPLER ECHO COLOR FLOW MAPG: CPT

## 2023-07-12 PROCEDURE — 99202 OFFICE O/P NEW SF 15 MIN: CPT | Mod: 25 | Performed by: PEDIATRICS

## 2023-07-12 PROCEDURE — 76825 ECHO EXAM OF FETAL HEART: CPT | Mod: 26 | Performed by: PEDIATRICS

## 2023-07-12 PROCEDURE — 76827 ECHO EXAM OF FETAL HEART: CPT | Mod: 26 | Performed by: PEDIATRICS

## 2023-07-12 PROCEDURE — 93325 DOPPLER ECHO COLOR FLOW MAPG: CPT | Mod: 26 | Performed by: PEDIATRICS

## 2023-07-12 NOTE — LETTER
2023      RE: Carri Sauer   [place] [address]     Dear Colleague,    Thank you for the opportunity to participate in the care of your patient, Carri Sauer, at the [facility] at [facility]. Please see a copy of my visit note below.    [facility]   Heart Center Fetal Consult Note    Patient:  Carri Sauer MRN:  [id number]   YOB: 1985 Age:  37 year old   Date of Visit:  2023 PCP:  Va Loaiza MD     Dear Doctor:    I had the pleasure of seeing Carri Sauer at the [facility] on 2023 in fetal cardiology consultation for fetal echocardiogram results. She presented today accompanied by her partner. As you know, she is a 37 year old  at 28w4d who presented for fetal echocardiogram today because of positive NIPT screening for trisomy 21. The fetus was also found to have bilateral pelviectasis.     I performed and interpreted the fetal echocardiogram today, which demonstrated Normal fetal cardiac anatomy. Normal fetal intracardiac connections. Normal right and left ventricular size and function. Fetal heart rate is regular at 148 bpm. No hydrops.     I reviewed today's findings with Carri and her partner with the help of a diagram. She is aware that the study was within normal limits with no major cardiac abnormalities. She is aware of the general limitations of fetal echocardiography. No additional fetal echocardiograms are recommended. A post-wilder echocardiogram prior to discharge is recommended to assess for CHD in the setting of Trisomy 21. Post-wilder echocardiogram should be performed within 24-48 hours of life.     Thank you for allowing me to participate in Carri's care. Please do not hesitate to contact me with questions or concerns.    This visit was separate from the performance and  interpretation of the ultrasound. The majority of the time (>50%) was spent in counseling and coordination of care. I spent approximately 20 minutes in face-to-face time reviewing the above considerations.    Damian Silverio M.D.  Pediatric Cardiology  [facility] [address] [place] [address]  Phone [phone]  Fax [phone]

## 2023-07-12 NOTE — PROGRESS NOTES
[facility]   Heart Center Fetal Consult Note    Patient:  Carri Sauer MRN:  [id number]   YOB: 1985 Age:  37 year old   Date of Visit:  2023 PCP:  Va Loaiza MD     Dear Doctor:    I had the pleasure of seeing Carri Sauer at the [facility] on 2023 in fetal cardiology consultation for fetal echocardiogram results. She presented today accompanied by her partner. As you know, she is a 37 year old  at 28w4d who presented for fetal echocardiogram today because of positive NIPT screening for trisomy 21. The fetus was also found to have bilateral pelviectasis.     I performed and interpreted the fetal echocardiogram today, which demonstrated Normal fetal cardiac anatomy. Normal fetal intracardiac connections. Normal right and left ventricular size and function. Fetal heart rate is regular at 148 bpm. No hydrops.     I reviewed today's findings with Carri and her partner with the help of a diagram. She is aware that the study was within normal limits with no major cardiac abnormalities. She is aware of the general limitations of fetal echocardiography. No additional fetal echocardiograms are recommended. A post- echocardiogram prior to discharge is recommended to assess for CHD in the setting of Trisomy 21. Post-wilder echocardiogram should be performed within 24-48 hours of life.     Thank you for allowing me to participate in Carri's care. Please do not hesitate to contact me with questions or concerns.    This visit was separate from the performance and interpretation of the ultrasound. The majority of the time (>50%) was spent in counseling and coordination of care. I spent approximately 20 minutes in face-to-face time reviewing the above considerations.    Damian Silverio M.D.  Pediatric Cardiology  [address] Academic Office Building 4th floor,  [facility] [address]  Phone [phone]  Fax [phone]

## 2023-07-17 ENCOUNTER — PRENATAL OFFICE VISIT (OUTPATIENT)
Dept: OBGYN | Facility: CLINIC | Age: 38
End: 2023-07-17
Payer: COMMERCIAL

## 2023-07-17 VITALS — DIASTOLIC BLOOD PRESSURE: 70 MMHG | SYSTOLIC BLOOD PRESSURE: 110 MMHG | WEIGHT: 160 LBS | BODY MASS INDEX: 27.46 KG/M2

## 2023-07-17 DIAGNOSIS — O26.899 RH NEGATIVE STATE IN ANTEPARTUM PERIOD: Primary | ICD-10-CM

## 2023-07-17 DIAGNOSIS — Z67.91 RH NEGATIVE STATE IN ANTEPARTUM PERIOD: Primary | ICD-10-CM

## 2023-07-17 LAB
ANTIBODY SCREEN: NEGATIVE
SPECIMEN EXPIRATION DATE: NORMAL

## 2023-07-17 PROCEDURE — 99207 PR PRENATAL VISIT: CPT | Performed by: OBSTETRICS & GYNECOLOGY

## 2023-07-17 PROCEDURE — 86850 RBC ANTIBODY SCREEN: CPT | Performed by: OBSTETRICS & GYNECOLOGY

## 2023-07-17 PROCEDURE — 36415 COLL VENOUS BLD VENIPUNCTURE: CPT | Performed by: OBSTETRICS & GYNECOLOGY

## 2023-07-17 PROCEDURE — 96372 THER/PROPH/DIAG INJ SC/IM: CPT | Performed by: OBSTETRICS & GYNECOLOGY

## 2023-07-17 NOTE — PROGRESS NOTES
37 year old  at 29w2d     - Oneg/RI. FOB Travis.  - H/o molar pregnancy 2021 - plan to send placenta postpartum, and obtain HCG at 6w PP visit  - rh neg, plan rhogam today, will pre-medicate with EMLA cream  - AMA, level 2 w bilateral UTDA2-3.  Has appt w peds urology on .   - NIPT +T21 XY.  S/p GC, normal fetal echo.  Has follow-up with MFM.   - TDaP at future visit if able to tolerate (has needle phobia)    RTC 2 wks     Va Loaiza MD, MPH  [facility] OB/Gyn

## 2023-07-25 ENCOUNTER — TELEPHONE (OUTPATIENT)
Dept: MATERNAL FETAL MEDICINE | Facility: CLINIC | Age: 38
End: 2023-07-25
Payer: COMMERCIAL

## 2023-07-25 NOTE — TELEPHONE ENCOUNTER
July 25, 2023      I called Kia to discuss the option of genetic testing via cord blood at the time of her delivery, given the positive NIPT for trisomy 21 (PPV - 79.3%). I left her my phone number to return my call.      Christos Lynn GC, MS  [facility]  Maternal Fetal Medicine  Office: [phone]  M: [phone]   Fax: [phone]  [facility]

## 2023-07-27 ENCOUNTER — TELEPHONE (OUTPATIENT)
Dept: MATERNAL FETAL MEDICINE | Facility: CLINIC | Age: 38
End: 2023-07-27
Payer: COMMERCIAL

## 2023-07-27 DIAGNOSIS — O28.5 ABNORMAL CHROMOSOMAL AND GENETIC FINDING ON ANTENATAL SCREENING MOTHER: Primary | ICD-10-CM

## 2023-07-27 NOTE — TELEPHONE ENCOUNTER
2023    Kia returned my call to discuss post-wilder testing via cord blood given the positive NIPT for trisomy 21 in the current pregnancy. Kia stated that she would like the testing performed. After clarification from my colleague, I informed her that, because she is delivering at [facility], her delivering physician will need to order cord blood testing at birth. I told Kia that I will contact Dr. Va Loaiza to discuss a genetic testing plan. Kia was in agreement with this plan.    Christos Lynn GC, MS  [facility]  Maternal Fetal Medicine  Office: [phone]  [facility]: [phone]   Fax: [phone]  [facility]

## 2023-08-08 ENCOUNTER — HOSPITAL ENCOUNTER (OUTPATIENT)
Dept: ULTRASOUND IMAGING | Facility: CLINIC | Age: 38
Discharge: HOME | End: 2023-08-08
Attending: OBSTETRICS & GYNECOLOGY
Payer: COMMERCIAL

## 2023-08-08 ENCOUNTER — OFFICE VISIT (OUTPATIENT)
Dept: MATERNAL FETAL MEDICINE | Facility: CLINIC | Age: 38
End: 2023-08-08
Attending: OBSTETRICS & GYNECOLOGY
Payer: COMMERCIAL

## 2023-08-08 DIAGNOSIS — O28.5 ABNORMAL CHROMOSOMAL AND GENETIC FINDING ON ANTENATAL SCREENING MOTHER: Primary | ICD-10-CM

## 2023-08-08 DIAGNOSIS — O35.EXX0 PYELECTASIS OF FETUS ON PRENATAL ULTRASOUND: ICD-10-CM

## 2023-08-08 DIAGNOSIS — O28.5 ABNORMAL CHROMOSOMAL AND GENETIC FINDING ON ANTENATAL SCREENING MOTHER: ICD-10-CM

## 2023-08-08 PROCEDURE — 76819 FETAL BIOPHYS PROFIL W/O NST: CPT

## 2023-08-08 PROCEDURE — 76819 FETAL BIOPHYS PROFIL W/O NST: CPT | Mod: 26 | Performed by: OBSTETRICS & GYNECOLOGY

## 2023-08-08 PROCEDURE — 76816 OB US FOLLOW-UP PER FETUS: CPT | Mod: 26 | Performed by: OBSTETRICS & GYNECOLOGY

## 2023-08-08 NOTE — PROGRESS NOTES
"Please see \"Imaging\" tab under \"Chart Review\" for details of today's US at the [facility].    Davy Vargas MD  Maternal-Fetal Medicine    "

## 2023-08-08 NOTE — NURSING NOTE
Patient presents to M for RL2/BPP. Positive fetal movement. Denies LOF, vaginal bleeding or cramping/contractions. SBAR given to LEDA MD, see their note in Epic.

## 2023-08-09 ENCOUNTER — DOCUMENTATION ONLY (OUTPATIENT)
Dept: OBGYN | Facility: CLINIC | Age: 38
End: 2023-08-09
Payer: COMMERCIAL

## 2023-08-10 NOTE — PROGRESS NOTES
How to collect cord blood at time of delivery per :  - - - - - - - - - - - - - - - - - - - - - - - - - - - - - - - - - - -     Here are instructions for ordering the appropriate genetic testing on the cord blood:     Cord blood Chromosome Analysis,  ([id number]). Specimen requirements are 3ml cord blood in green top sodium heparin tube. Preliminary results are expected within 48 hours, and final results within 7 days.     It would be great if you could route the results to me. I would be happy to call the patient and explain results if you would prefer - just let me know. I will add myself to her care team. If any questions arise, please feel free to contact me.     Christos Lynn GC, MS   [facility]  Maternal Fetal Medicine   Office: [phone]   Pager: [phone]     [facility]: [phone]   Fax: [phone]   [facility]

## 2023-08-15 ENCOUNTER — HOSPITAL ENCOUNTER (OUTPATIENT)
Dept: ULTRASOUND IMAGING | Facility: CLINIC | Age: 38
Discharge: HOME | End: 2023-08-15
Attending: OBSTETRICS & GYNECOLOGY
Payer: COMMERCIAL

## 2023-08-15 ENCOUNTER — PRENATAL OFFICE VISIT (OUTPATIENT)
Dept: OBGYN | Facility: CLINIC | Age: 38
End: 2023-08-15
Payer: COMMERCIAL

## 2023-08-15 ENCOUNTER — OFFICE VISIT (OUTPATIENT)
Dept: MATERNAL FETAL MEDICINE | Facility: CLINIC | Age: 38
End: 2023-08-15
Attending: OBSTETRICS & GYNECOLOGY
Payer: COMMERCIAL

## 2023-08-15 VITALS
HEIGHT: 64 IN | DIASTOLIC BLOOD PRESSURE: 60 MMHG | SYSTOLIC BLOOD PRESSURE: 116 MMHG | BODY MASS INDEX: 28.85 KG/M2 | WEIGHT: 169 LBS

## 2023-08-15 DIAGNOSIS — O26.899 RH NEGATIVE STATE IN ANTEPARTUM PERIOD: ICD-10-CM

## 2023-08-15 DIAGNOSIS — O28.5 ABNORMAL CHROMOSOMAL AND GENETIC FINDING ON ANTENATAL SCREENING MOTHER: ICD-10-CM

## 2023-08-15 DIAGNOSIS — O35.10X0 ANEUPLOIDY IN FETUS AFFECTING MANAGEMENT OF MOTHER, SINGLE OR UNSPECIFIED FETUS: Primary | ICD-10-CM

## 2023-08-15 DIAGNOSIS — Z91.89 AT RISK FOR ANEUPLOIDY: ICD-10-CM

## 2023-08-15 DIAGNOSIS — Z34.80 SUPERVISION OF OTHER NORMAL PREGNANCY, ANTEPARTUM: Primary | ICD-10-CM

## 2023-08-15 DIAGNOSIS — Z67.91 RH NEGATIVE STATE IN ANTEPARTUM PERIOD: ICD-10-CM

## 2023-08-15 DIAGNOSIS — O09.529 SUPERVISION OF HIGH-RISK PREGNANCY OF ELDERLY MULTIGRAVIDA: ICD-10-CM

## 2023-08-15 PROCEDURE — 76819 FETAL BIOPHYS PROFIL W/O NST: CPT

## 2023-08-15 PROCEDURE — 99207 PR PRENATAL VISIT: CPT | Performed by: OBSTETRICS & GYNECOLOGY

## 2023-08-15 PROCEDURE — 76819 FETAL BIOPHYS PROFIL W/O NST: CPT | Mod: 26 | Performed by: OBSTETRICS & GYNECOLOGY

## 2023-08-15 NOTE — NURSING NOTE
"Chief Complaint   Patient presents with    Prenatal Care     33 2/7 weeks       Initial /60 (BP Location: Right arm, Patient Position: Chair, Cuff Size: Adult Regular)   Ht 1.626 m (5' 4\")   Wt 76.7 kg (169 lb)   LMP 2022   Breastfeeding No   BMI 29.01 kg/m   Estimated body mass index is 29.01 kg/m  as calculated from the following:    Height as of this encounter: 1.626 m (5' 4\").    Weight as of this encounter: 76.7 kg (169 lb).  BP completed using cuff size: regular    Questioned patient about current smoking habits.  Pt. has never smoked.          The following HM Due: NONE    +fetal movement  -swelling  Ivory Cadena, CMA    "

## 2023-08-15 NOTE — PROGRESS NOTES
Please see full imaging report from [employer] program under imaging tab.    Isai Price MD  Maternal Fetal Medicine

## 2023-08-15 NOTE — NURSING NOTE
Patient presents to [facility] for BPP at 33w3d due to +NIPT for T21. Positive fetal movement. Denies LOF, vaginal bleeding or cramping/contractions. SBAR given to ERICA MARTINEZ, see their note in Epic.

## 2023-08-16 PROBLEM — Z91.89 AT RISK FOR ANEUPLOIDY: Status: ACTIVE | Noted: 2023-08-16

## 2023-08-16 NOTE — PROGRESS NOTES
37 year old  at 33w3d     - Oneg/RI. FOB Travis.  - H/o molar pregnancy 2021 - plan to send placenta postpartum, and obtain HCG at 6w PP visit  - rh neg, s/p rhogam.  Needle phobia, declines TDaP after counseling  - AMA, level 2 w bilateral UTDA2-3, following w MFM.  Has appt w peds urology on .   - NIPT +T21 XY.  S/p GC, normal fetal echo.  Has follow-up with MFM.   - per GC: plan for FISH with reflex to karyotype cord testing at delivery (see progress note dated 23 for instructions)    RTC 2 wks     Va Loaiza MD, MPH  [facility] OB/Gyn

## 2023-08-22 ENCOUNTER — HOSPITAL ENCOUNTER (OUTPATIENT)
Dept: ULTRASOUND IMAGING | Facility: CLINIC | Age: 38
Discharge: HOME | End: 2023-08-22
Attending: OBSTETRICS & GYNECOLOGY
Payer: COMMERCIAL

## 2023-08-22 ENCOUNTER — OFFICE VISIT (OUTPATIENT)
Dept: MATERNAL FETAL MEDICINE | Facility: CLINIC | Age: 38
End: 2023-08-22
Attending: OBSTETRICS & GYNECOLOGY
Payer: COMMERCIAL

## 2023-08-22 DIAGNOSIS — O35.10X0 ANEUPLOIDY IN FETUS AFFECTING MANAGEMENT OF MOTHER, SINGLE OR UNSPECIFIED FETUS: Primary | ICD-10-CM

## 2023-08-22 DIAGNOSIS — O28.5 ABNORMAL CHROMOSOMAL AND GENETIC FINDING ON ANTENATAL SCREENING MOTHER: ICD-10-CM

## 2023-08-22 PROCEDURE — 76819 FETAL BIOPHYS PROFIL W/O NST: CPT | Mod: 26 | Performed by: OBSTETRICS & GYNECOLOGY

## 2023-08-22 PROCEDURE — 76819 FETAL BIOPHYS PROFIL W/O NST: CPT

## 2023-08-22 NOTE — PROGRESS NOTES
"Please see \"Imaging\" tab under \"Chart Review\" for details of today's US at the [facility].    Davy Varags MD  Maternal-Fetal Medicine    "
no

## 2023-08-23 ENCOUNTER — MYC MEDICAL ADVICE (OUTPATIENT)
Dept: OBGYN | Facility: CLINIC | Age: 38
End: 2023-08-23
Payer: COMMERCIAL

## 2023-08-29 ENCOUNTER — OFFICE VISIT (OUTPATIENT)
Dept: MATERNAL FETAL MEDICINE | Facility: CLINIC | Age: 38
End: 2023-08-29
Attending: OBSTETRICS & GYNECOLOGY
Payer: COMMERCIAL

## 2023-08-29 ENCOUNTER — HOSPITAL ENCOUNTER (OUTPATIENT)
Dept: ULTRASOUND IMAGING | Facility: CLINIC | Age: 38
Discharge: HOME | End: 2023-08-29
Attending: OBSTETRICS & GYNECOLOGY
Payer: COMMERCIAL

## 2023-08-29 DIAGNOSIS — O35.10X0 ANEUPLOIDY IN FETUS AFFECTING MANAGEMENT OF MOTHER, SINGLE OR UNSPECIFIED FETUS: Primary | ICD-10-CM

## 2023-08-29 DIAGNOSIS — O28.5 ABNORMAL CHROMOSOMAL AND GENETIC FINDING ON ANTENATAL SCREENING MOTHER: ICD-10-CM

## 2023-08-29 PROCEDURE — 76818 FETAL BIOPHYS PROFILE W/NST: CPT | Mod: 26 | Performed by: OBSTETRICS & GYNECOLOGY

## 2023-08-29 PROCEDURE — 76818 FETAL BIOPHYS PROFILE W/NST: CPT

## 2023-08-29 NOTE — NURSING NOTE
Patient reports active fetal movement, denies pain,  contractions, leaking of fluid, or bleeding.  Patient denies headache, visual changes, nausea/vomiting, epigastric pain related to preeclampsia.  SBAR given to MFERICA MD, see their note in Epic.    NST Performed due to BPP 6/8, off for breathing.   reviewed efm tracing. See NST/BPP Doc Flowsheet tab.

## 2023-08-29 NOTE — PATIENT INSTRUCTIONS
Patient Education   Biophysical Profile and Non-Stress Test  What are these tests for?  You may need these tests if your baby is at risk for certain problems. The tests will check your baby's health. They are often done in the third trimester.   You may have one or both tests every week until your baby is born. Some women have them twice a week.   What is a biophysical profile?  A biophysical profile is an ultrasound exam. The ultrasound will check your baby's:   Breathing movements  Body movements  Muscle tone  Amniotic fluid (the fluid around the baby).  All of this will tell us how healthy your baby is. This test takes about 30 minutes.  What is a non-stress test?  This test uses a fetal monitor to measure the baby's heart rate.   You will lie back in a chair or on a bed. We will place two belts around your abdomen. The belts are attached to a monitor.   This test will record:  the baby's heart rate  the baby's movements (the heart rate should increase when the baby moves)  if your uterus is sarita (contractions, or tightenings, are common as you get closer to your due date).  A non-stress test takes about 30 minutes.   How do I get ready for these tests?  Don't smoke for at least 2 hours before these tests. In fact, you should quit smoking if you are pregnant.   When will I get my results?  You will have your results before you leave the clinic.   What if my baby doesn't pass these tests?  Most tests come out well. If your results are not reassuring, your care team will talk to you about your options. You may need to go to the hospital birthplace, where we can watch you more closely.  For informational purposes only. Not to replace the advice of your health care provider.   Copyright   2000, 2005 [employer] [facility]. Clinically reviewed by Maternal-Fetal Medicine Department.  All rights reserved. [employer] 780327 - REV 10/21.

## 2023-09-05 ENCOUNTER — OFFICE VISIT (OUTPATIENT)
Dept: MATERNAL FETAL MEDICINE | Facility: CLINIC | Age: 38
End: 2023-09-05
Attending: OBSTETRICS & GYNECOLOGY
Payer: COMMERCIAL

## 2023-09-05 ENCOUNTER — PRENATAL OFFICE VISIT (OUTPATIENT)
Dept: OBGYN | Facility: CLINIC | Age: 38
End: 2023-09-05
Payer: COMMERCIAL

## 2023-09-05 ENCOUNTER — HOSPITAL ENCOUNTER (OUTPATIENT)
Dept: ULTRASOUND IMAGING | Facility: CLINIC | Age: 38
Discharge: HOME | End: 2023-09-05
Attending: OBSTETRICS & GYNECOLOGY
Payer: COMMERCIAL

## 2023-09-05 VITALS
SYSTOLIC BLOOD PRESSURE: 110 MMHG | HEIGHT: 64 IN | BODY MASS INDEX: 29.37 KG/M2 | WEIGHT: 172 LBS | DIASTOLIC BLOOD PRESSURE: 60 MMHG

## 2023-09-05 DIAGNOSIS — O28.5 ABNORMAL CHROMOSOMAL AND GENETIC FINDING ON ANTENATAL SCREENING MOTHER: ICD-10-CM

## 2023-09-05 DIAGNOSIS — O35.EXX0 PYELECTASIS OF FETUS ON PRENATAL ULTRASOUND: ICD-10-CM

## 2023-09-05 DIAGNOSIS — Z36.85 SCREENING, ANTENATAL, FOR STREPTOCOCCUS B: Primary | ICD-10-CM

## 2023-09-05 DIAGNOSIS — O28.5 ABNORMAL CHROMOSOMAL AND GENETIC FINDING ON ANTENATAL SCREENING MOTHER: Primary | ICD-10-CM

## 2023-09-05 PROCEDURE — 76819 FETAL BIOPHYS PROFIL W/O NST: CPT

## 2023-09-05 PROCEDURE — 76819 FETAL BIOPHYS PROFIL W/O NST: CPT | Mod: 26 | Performed by: OBSTETRICS & GYNECOLOGY

## 2023-09-05 PROCEDURE — 76816 OB US FOLLOW-UP PER FETUS: CPT | Mod: 26 | Performed by: OBSTETRICS & GYNECOLOGY

## 2023-09-05 PROCEDURE — 87653 STREP B DNA AMP PROBE: CPT | Performed by: OBSTETRICS & GYNECOLOGY

## 2023-09-05 PROCEDURE — 99207 PR PRENATAL VISIT: CPT | Performed by: OBSTETRICS & GYNECOLOGY

## 2023-09-05 NOTE — NURSING NOTE
Carri presents to [facility] for follow up ultraound and BPP. Patient reports good fetal movement, denies contractions, leaking of fluid, or bleeding.

## 2023-09-05 NOTE — PROGRESS NOTES
"Please see \"Imaging\" tab under \"Chart Review\" for details of today's US at the [facility].    Davy Vargas MD  Maternal-Fetal Medicine    "

## 2023-09-05 NOTE — PROGRESS NOTES
37 year old  at 36w3d     - Oneg/RI. FOB Travis.  - H/o molar pregnancy 2021 - plan to send placenta postpartum, and obtain HCG at 6w PP visit  - rh neg, s/p rhogam.  Needle phobia, declines TDaP after counseling  - AMA, level 2 w bilateral UTDA2-3, following w MFM.  Has appt w peds urology on .   - NIPT +T21 XY.  S/p GC, normal fetal echo.  Has follow-up with MFM.   - per GC: plan for FISH with reflex to karyotype cord testing at delivery (see progress note dated 23 for instructions)  - breech presentation: reviewed r/b/a ECV vs PLTCS.  Delivery recommended btw 39-39+6.  Recommend ECV sometime this upcoming week if interested.  They will discuss and call clinic when they have decided upon a procedure.  - LGA baby, EFW 94%ile and AC >99%ile.  Pelvis proven 7.5#  - cvx and GBS today    RTC weekly until delivery     Va Loaiza MD, MPH  [facility]

## 2023-09-05 NOTE — NURSING NOTE
"Chief Complaint   Patient presents with    Prenatal Care     36 3/7 weeks       Initial /60   Ht 1.626 m (5' 4\")   Wt 78 kg (172 lb)   LMP 2022   BMI 29.52 kg/m   Estimated body mass index is 29.52 kg/m  as calculated from the following:    Height as of this encounter: 1.626 m (5' 4\").    Weight as of this encounter: 78 kg (172 lb).  BP completed using cuff size: regular    Questioned patient about current smoking habits.  Pt. has never smoked.          The following HM Due: GBS  +fetal movement  -swelling  Ivory Cadena, CMA    "

## 2023-09-06 ENCOUNTER — TELEPHONE (OUTPATIENT)
Dept: OBGYN | Facility: CLINIC | Age: 38
End: 2023-09-06
Payer: COMMERCIAL

## 2023-09-06 LAB — GP B STREP DNA SPEC QL NAA+PROBE: NEGATIVE

## 2023-09-06 NOTE — TELEPHONE ENCOUNTER
atient Name: Carri Sauer   MRN: [id number]   Case#: [id number]   Surgeon(s) and Role:      * Va Loaiza MD - Primary   Date requested: * No dates entered *   Location:  L+D   Procedure(s):

## 2023-09-06 NOTE — TELEPHONE ENCOUNTER
Type of surgery:   Location of surgery: [facility]  Date and time of surgery: 23 @ 12:00 pm  Surgeon: Dr. Edgar Loaiza  Pre-Op Appt Date: @ prenatal appointment  Post-Op Appt Date: 23   Packet sent out: Yes  Pre-cert/Authorization completed:  No  Date: 23

## 2023-09-11 ENCOUNTER — VIRTUAL VISIT (OUTPATIENT)
Dept: UROLOGY | Facility: CLINIC | Age: 38
End: 2023-09-11
Payer: COMMERCIAL

## 2023-09-11 ENCOUNTER — PRENATAL OFFICE VISIT (OUTPATIENT)
Dept: OBGYN | Facility: CLINIC | Age: 38
End: 2023-09-11
Payer: COMMERCIAL

## 2023-09-11 VITALS — HEIGHT: 64 IN | BODY MASS INDEX: 29.37 KG/M2 | WEIGHT: 172 LBS

## 2023-09-11 VITALS
DIASTOLIC BLOOD PRESSURE: 70 MMHG | HEIGHT: 64 IN | SYSTOLIC BLOOD PRESSURE: 102 MMHG | WEIGHT: 172 LBS | BODY MASS INDEX: 29.37 KG/M2

## 2023-09-11 DIAGNOSIS — O35.EXX0 PYELECTASIS OF FETUS ON PRENATAL ULTRASOUND: ICD-10-CM

## 2023-09-11 DIAGNOSIS — Z34.80 SUPERVISION OF OTHER NORMAL PREGNANCY, ANTEPARTUM: Primary | ICD-10-CM

## 2023-09-11 PROCEDURE — 99203 OFFICE O/P NEW LOW 30 MIN: CPT | Mod: VID | Performed by: NURSE PRACTITIONER

## 2023-09-11 PROCEDURE — 99207 PR PRENATAL VISIT: CPT | Performed by: OBSTETRICS & GYNECOLOGY

## 2023-09-11 ASSESSMENT — PAIN SCALES - GENERAL: PAINLEVEL: NO PAIN (0)

## 2023-09-11 NOTE — PROGRESS NOTES
PCP:  Va Loaiza  [id number] [facility] [id number]    Referred by Davy Lopez     RE:  Carri Sauer  :  1985  [place] MRN:  [id number]  Date of visit:  2023    Dear Dr. aVrgas and Josse:    I had the pleasure of seeing your patient, Carri, today through the [facility] in urology consultation via video visit for the question of prenatally detected bilateral congenital hydronephrosis.  Please see below the details of this visit and my impression and plans discussed with the family.    CC:  Prenatally detected bilateral hydronephrosis and bilateral hydroureter     HPI:  Carri is a 37 year old woman whom I was asked to see in consultation for the above.  This is Carri's third pregnancy, second child.  The sex of the fetus is Male.  At the 36 week ultrasound baby had bilateral hydronephrosis along with dilated ureter left kidney 17.3 measuring right kidney measuring  14.7 and dilated ureters, normal amount of amniotic fluid, normal bladder.  So far the pregnancy has been uncomplicated, baby does have trisomy 21.  Carri is planning to deliver at [facility], she will be induced or having  .  There is no family history of genitourinary disorders in childhood.      PE:  GENERAL: Healthy, alert and no distress  RESP: No audible wheeze, cough.  PSYCH: Mentation appears normal, affect normal/bright, judgement and insight intact, normal speech.      Impression:  Prenatal finding bilateral congenital hydronephrosis and bilateral hydroureter UTD 2-3 increased risk    Plan:    As long as Baby is doing well initially after birth this is the plan we have discussed:  UTI prophylactic: Low-dose Amoxicillin, dosed at 10-15 mg/kg/day  VCUG and Renal Ultrasound around 2 weeks of life with subsequent follow up in our office     Phone number to our nurse given to patient so she can call  us when the baby is born to arrange follow up, information also available on  AVS (After Visit Summary).    We discussed the likely prenatal causes for this, including prenatal obstructive issues that have already resolved versus those that may need surgical help with resolution in childhood, as well as the possibility of vesicoureteral reflux.  We discussed the risks for urinary tract infection, and the pros and cons of starting the baby on daily, low-dose Amoxicillin.    I addressed all questions and encouraged a phone call from their MFM if there are any future concerns during the pregnancy.    Thank you very much for allowing me the opportunity to participate in this nice family's care with you.      Type of service:  Video visit  duration: Start time: 2:55 Stop Time:3:15  Total Time: 20 minutes  Originating Location (pt. Location): Home  Distant Location (provider location):  [facility]   Mode of Communication:  Amwell    30 minutes spent on the date of the encounter doing chart review, history and exam, documentation, education and further activities per the note.    Sincerely,  Yanna ALAMO, CPNP  Pediatric Urology  [facility]    CC Dr. Loaiza, Dr. Vargas

## 2023-09-11 NOTE — PROGRESS NOTES
37 year old  at 37w2d     - Oneg/RI. FOB Travis.  - H/o molar pregnancy 2021 - plan to send placenta postpartum, and obtain HCG at 6w PP visit  - rh neg, s/p rhogam.  Needle phobia, declines TDaP after counseling  - AMA, level 2 w bilateral UTDA2-3, following w MFM.  Has appt w peds urology today.   - NIPT +T21 XY.  S/p GC, normal fetal echo.  Has follow-up with MFM.   - per GC: plan for FISH with reflex to karyotype cord testing at delivery (see progress note dated 23 for instructions)  - breech presentation: planning PLTCS on . ERAS instructions given today.   - LGA baby, EFW 94%ile and AC >99%ile.  Pelvis proven 7.5#    RTC 1 wk     Va Loaiza MD, MPH  [facility]

## 2023-09-11 NOTE — PATIENT INSTRUCTIONS
[facility]   Department of Pediatric Urology    Dr. Peter Braden, Pediatric Urologist  Dr. Chapman, Pediatric Urologist  Yanna Kerr, GARY    [facility] [place]  Nurse Coordinator: Leonie Vera, [phone]    [facility]  Nurse Coordinator: [phone]    [place]  Nurse Coordinator: Jody Hammond [phone]    [place]  Nurse Coordinator: ADAN Luna, [phone]      Once your baby is born, please do the following:  - Have your baby s doctor start your baby on a prophylactic  antibiotic. We recommend Amoxicillin 10-15mg/kg/day. Your  baby s doctor will write you a prescription before you leave the  hospital. This medication is given once a day at bedtime and is  used to prevent urinary tract infections. Your baby should stay on  this medication until we tell you to stop giving it.    -After you have gone home, please call the Nurse Coordinator at your preferred  location and give her your baby s name and date of birth. She will  help coordinate the tests that need to be done about 2 weeks  after birth.    Your baby s test may include:  -Renal Bladder Ultrasound  (all locations)  - Voiding Cystourethrogram (VCUG)  ([facility])

## 2023-09-11 NOTE — NURSING NOTE
"Chief Complaint   Patient presents with    Prenatal Care     37 2/7 weeks       Initial /70 (BP Location: Left arm, Patient Position: Chair, Cuff Size: Adult Regular)   Ht 1.626 m (5' 4\")   Wt 78 kg (172 lb)   LMP 2022   Breastfeeding No   BMI 29.52 kg/m   Estimated body mass index is 29.52 kg/m  as calculated from the following:    Height as of this encounter: 1.626 m (5' 4\").    Weight as of this encounter: 78 kg (172 lb).  BP completed using cuff size: regular    Questioned patient about current smoking habits.  Pt. has never smoked.          The following HM Due: NONE  +fetal movement  -swelling  Ivory Cadena, CMA      "

## 2023-09-11 NOTE — NURSING NOTE
Is the patient currently in the state of [place]? YES    Visit mode:VIDEO    If the visit is dropped, the patient can be reconnected by: VIDEO VISIT: Text to cell phone:   Telephone Information:   Mobile [phone]       Will anyone else be joining the visit? NO  (If patient encounters technical issues they should call [phone] :[phone])    How would you like to obtain your AVS? MyChart    Are changes needed to the allergy or medication list? Pt stated no changes to allergies and Pt stated no med changes  Please remove any meds marked not taking and any flagged for removal.    Reason for visit: Consult    Wt/ht other than 24 hrs:    Pain more than one location:  no  Rekha AHMADI

## 2023-09-12 ENCOUNTER — HOSPITAL ENCOUNTER (OUTPATIENT)
Dept: ULTRASOUND IMAGING | Facility: CLINIC | Age: 38
Discharge: HOME | End: 2023-09-12
Attending: OBSTETRICS & GYNECOLOGY
Payer: COMMERCIAL

## 2023-09-12 ENCOUNTER — OFFICE VISIT (OUTPATIENT)
Dept: MATERNAL FETAL MEDICINE | Facility: CLINIC | Age: 38
End: 2023-09-12
Attending: OBSTETRICS & GYNECOLOGY
Payer: COMMERCIAL

## 2023-09-12 DIAGNOSIS — O28.5 ABNORMAL CHROMOSOMAL AND GENETIC FINDING ON ANTENATAL SCREENING MOTHER: ICD-10-CM

## 2023-09-12 DIAGNOSIS — O28.5 ABNORMAL CHROMOSOMAL AND GENETIC FINDING ON ANTENATAL SCREENING MOTHER: Primary | ICD-10-CM

## 2023-09-12 PROCEDURE — 76819 FETAL BIOPHYS PROFIL W/O NST: CPT | Mod: 26 | Performed by: OBSTETRICS & GYNECOLOGY

## 2023-09-12 PROCEDURE — 76819 FETAL BIOPHYS PROFIL W/O NST: CPT

## 2023-09-13 NOTE — PROGRESS NOTES
"Please see \"Imaging\" tab under \"Chart Review\" for details of today's US at the [facility].    Davy Vargas MD  Maternal-Fetal Medicine    "

## 2023-09-15 RX ORDER — ASPIRIN 81 MG/1
81 TABLET, CHEWABLE ORAL DAILY
Status: ON HOLD | COMMUNITY
End: 2023-09-28

## 2023-09-18 ENCOUNTER — PRENATAL OFFICE VISIT (OUTPATIENT)
Dept: OBGYN | Facility: CLINIC | Age: 38
End: 2023-09-18
Payer: COMMERCIAL

## 2023-09-18 VITALS
HEIGHT: 64 IN | SYSTOLIC BLOOD PRESSURE: 106 MMHG | DIASTOLIC BLOOD PRESSURE: 70 MMHG | WEIGHT: 175 LBS | BODY MASS INDEX: 29.88 KG/M2

## 2023-09-18 DIAGNOSIS — Z34.80 SUPERVISION OF OTHER NORMAL PREGNANCY, ANTEPARTUM: Primary | ICD-10-CM

## 2023-09-18 DIAGNOSIS — O26.899 RH NEGATIVE STATE IN ANTEPARTUM PERIOD: ICD-10-CM

## 2023-09-18 DIAGNOSIS — O08.89 PARTIAL MOLAR PREGNANCY: ICD-10-CM

## 2023-09-18 DIAGNOSIS — Z67.91 RH NEGATIVE STATE IN ANTEPARTUM PERIOD: ICD-10-CM

## 2023-09-18 PROCEDURE — 99207 PR PRENATAL VISIT: CPT | Performed by: OBSTETRICS & GYNECOLOGY

## 2023-09-18 NOTE — NURSING NOTE
"Chief Complaint   Patient presents with    Prenatal Care     38 2/7 weeks       Initial /70 (BP Location: Right arm, Patient Position: Chair, Cuff Size: Adult Regular)   Ht 1.626 m (5' 4\")   Wt 79.4 kg (175 lb)   LMP 2022   Breastfeeding No   BMI 30.04 kg/m   Estimated body mass index is 30.04 kg/m  as calculated from the following:    Height as of this encounter: 1.626 m (5' 4\").    Weight as of this encounter: 79.4 kg (175 lb).  BP completed using cuff size: regular    Questioned patient about current smoking habits.  Pt. has never smoked.          The following HM Due: NONE    +fetal movement  -swelling  Ivory Cadena, CMA    "

## 2023-09-18 NOTE — PROGRESS NOTES
L&D History and Physical   2023  Carri Sauer  [id number]      HPI: Carri Sauer is a 37 year old  at 38w2d here for preop clearance for PLTCS scheduled on  for breech.    She states that she is feeling well today.  + FM.       Pregnancy notable for:  --h/o molar pregnancy 2021, recommended to sending placenta and then getting HCG level 6 weeks after delivery  --NIPT positive for T21, normal fetal echo, recommended for FISH with reflex to karyotype cord blood testing  --RH neg, planning rhogam at time of delivery (has needle phobia)  --history of PPH after first delivery  --UTDA2-3, s/p peds urology appt   --h/o breast surgery, planning formula feeding  --breech presentation    OBHX:   OB History    Para Term  AB Living   4 1 1 0 2 1   SAB IAB Ectopic Multiple Live Births   1 0 0 0 1      # Outcome Date GA Lbr Dale/2nd Weight Sex Delivery Anes PTL Lv   4 Current            3 Term 06/01/10 39w6d  3.175 kg (7 lb) F Vag-Spont EPI N KIARRA      Complications: Hemorrhage      Name: Hortencia   2 SAB            1 Molar               Obstetric Comments   Partial mole 2021, 70 XXY   SAB 2022       MedicalHX:   Past Medical History:   Diagnosis Date    ASCUS with positive high risk HPV cervical 2010 ASCUS, +HR HPV (care everywhere)       SurgicalHX:   Past Surgical History:   Procedure Laterality Date    CA BREAST AUGMENTATION Bilateral        Medications:   aspirin (ASA) 81 MG chewable tablet, Take 81 mg by mouth daily  Prenatal Vit-Fe Fumarate-FA (PRENATAL VITAMIN) 27-0.8 MG TABS,   acetaminophen (TYLENOL) 325 MG tablet, Take 325-650 mg by mouth every 6 hours as needed for mild pain (Patient not taking: Reported on 2023)    No current facility-administered medications on file prior to visit.      Allergies:  No Known Allergies    FamilyHX:    Family History   Problem Relation Age of Onset    No Known Problems Mother     No Known  "Problems Father     No Known Problems Brother     Heart Failure Maternal Grandmother     Cancer Maternal Grandfather     Pancreatic Cancer Paternal Grandmother     Lung Cancer Paternal Grandfather        SocialHX:   Social History     Socioeconomic History    Marital status: Single     Spouse name: Travis    Number of children: 1    Years of education: None    Highest education level: None   Occupational History    Occupation: Coding and sales   Tobacco Use    Smoking status: Never    Smokeless tobacco: Never   Vaping Use    Vaping Use: Never used   Substance and Sexual Activity    Alcohol use: Not Currently    Drug use: Never    Sexual activity: Yes     Partners: Male       ROS: 10-point ROS negative except as in HPI     Physical Exam:  Vitals:    23 0849   BP: 106/70   BP Location: Right arm   Patient Position: Chair   Cuff Size: Adult Regular   Weight: 79.4 kg (175 lb)   Height: 1.626 m (5' 4\")     GEN: resting comfortably in bed, NAD   CV: regular rate, ext WWP  PULM: no increased work of breathing, no cough/wheeze  ABD: soft, gravid, non-tender, non-distended  EXT: trace edema, non tender to palpation  CVX: deferred  Presentation: breech by Leopold's and US  EFW: LGA     Labs:      Lab Results   Component Value Date    ABO O 2021    RH Neg 2021    AS Negative 2023    HEPBANG Nonreactive 2023    CHPCRT Negative 2021    GCPCRT Negative 2021    HGB 10.3 (L) 2023       GBS Status:   No results found for: GBS    Lab Results   Component Value Date    PAP NIL 2021       A/P: Carri Sauer is a 37 year old female  at 38w2d, here for preop clearance for scheduled CS on  for breech.  --h/o molar pregnancy 2021, recommended to sending placenta and then getting HCG level 6 weeks after delivery  --NIPT positive for T21, normal fetal echo, recommended for FISH with reflex to karyotype cord blood testing  --RH neg, planning rhogam at time of delivery " (has needle phobia)  --history of PPH after first delivery  --UTDA2-3, s/p peds urology appt, recommendation for UTI prophylactic: Low-dose Amoxicillin, dosed at 10-15 mg/kg/day as well as VCUG and Renal Ultrasound around 2 weeks of life with subsequent follow up in Urology office    --h/o breast surgery, planning formula feeding  --breech presentation  --LGA baby 94%ile with AC >99%ile    S/p ERAS teaching.  Has had all questions answered.  Is cleared for surgery on 9/25.    Va Loaiza MD, MPH  [facility] OB/G[facility]

## 2023-09-19 ENCOUNTER — OFFICE VISIT (OUTPATIENT)
Dept: MATERNAL FETAL MEDICINE | Facility: CLINIC | Age: 38
End: 2023-09-19
Attending: OBSTETRICS & GYNECOLOGY
Payer: COMMERCIAL

## 2023-09-19 ENCOUNTER — HOSPITAL ENCOUNTER (OUTPATIENT)
Dept: ULTRASOUND IMAGING | Facility: CLINIC | Age: 38
Discharge: HOME | End: 2023-09-19
Attending: OBSTETRICS & GYNECOLOGY
Payer: COMMERCIAL

## 2023-09-19 DIAGNOSIS — O28.5 ABNORMAL CHROMOSOMAL AND GENETIC FINDING ON ANTENATAL SCREENING MOTHER: ICD-10-CM

## 2023-09-19 DIAGNOSIS — O28.5 ABNORMAL CHROMOSOMAL AND GENETIC FINDING ON ANTENATAL SCREENING MOTHER: Primary | ICD-10-CM

## 2023-09-19 PROCEDURE — 76819 FETAL BIOPHYS PROFIL W/O NST: CPT | Mod: 26 | Performed by: OBSTETRICS & GYNECOLOGY

## 2023-09-19 PROCEDURE — 76819 FETAL BIOPHYS PROFIL W/O NST: CPT

## 2023-09-19 NOTE — PROGRESS NOTES
"Please see \"Imaging\" tab under \"Chart Review\" for details of today's US at the [facility].    Davy Vargas MD  Maternal-Fetal Medicine    "

## 2023-09-25 ENCOUNTER — HOSPITAL ENCOUNTER (INPATIENT)
Facility: CLINIC | Age: 38
LOS: 3 days | Discharge: HOME | End: 2023-09-28
Attending: OBSTETRICS & GYNECOLOGY | Admitting: OBSTETRICS & GYNECOLOGY
Payer: COMMERCIAL

## 2023-09-25 ENCOUNTER — ANESTHESIA EVENT (OUTPATIENT)
Dept: SURGERY | Facility: CLINIC | Age: 38
End: 2023-09-25
Payer: COMMERCIAL

## 2023-09-25 ENCOUNTER — ANESTHESIA (OUTPATIENT)
Dept: SURGERY | Facility: CLINIC | Age: 38
End: 2023-09-25
Payer: COMMERCIAL

## 2023-09-25 DIAGNOSIS — Z98.891 S/P CESAREAN SECTION: Primary | ICD-10-CM

## 2023-09-25 DIAGNOSIS — Z67.91 RH NEGATIVE STATE IN ANTEPARTUM PERIOD: ICD-10-CM

## 2023-09-25 DIAGNOSIS — Z34.81 ENCOUNTER FOR SUPERVISION OF OTHER NORMAL PREGNANCY IN FIRST TRIMESTER: ICD-10-CM

## 2023-09-25 DIAGNOSIS — O26.899 RH NEGATIVE STATE IN ANTEPARTUM PERIOD: ICD-10-CM

## 2023-09-25 LAB
ABO/RH(D): ABNORMAL
ABO/RH(D): NORMAL
ANTIBODY SCREEN: POSITIVE
FETAL BLEED SCREEN: NORMAL
HGB BLD-MCNC: 12.8 G/DL (ref 11.7–15.7)
SPECIMEN EXPIRATION DATE: ABNORMAL
SPECIMEN EXPIRATION DATE: NORMAL
T PALLIDUM AB SER QL: NONREACTIVE

## 2023-09-25 PROCEDURE — 85018 HEMOGLOBIN: CPT | Performed by: OBSTETRICS & GYNECOLOGY

## 2023-09-25 PROCEDURE — 250N000009 HC RX 250: Performed by: ANESTHESIOLOGY

## 2023-09-25 PROCEDURE — 86901 BLOOD TYPING SEROLOGIC RH(D): CPT | Performed by: OBSTETRICS & GYNECOLOGY

## 2023-09-25 PROCEDURE — 250N000013 HC RX MED GY IP 250 OP 250 PS 637: Performed by: OBSTETRICS & GYNECOLOGY

## 2023-09-25 PROCEDURE — 250N000009 HC RX 250: Performed by: NURSE ANESTHETIST, CERTIFIED REGISTERED

## 2023-09-25 PROCEDURE — 710N000009 HC RECOVERY PHASE 1, LEVEL 1, PER MIN: Performed by: OBSTETRICS & GYNECOLOGY

## 2023-09-25 PROCEDURE — 250N000011 HC RX IP 250 OP 636: Performed by: OBSTETRICS & GYNECOLOGY

## 2023-09-25 PROCEDURE — 258N000003 HC RX IP 258 OP 636: Performed by: OBSTETRICS & GYNECOLOGY

## 2023-09-25 PROCEDURE — 272N000001 HC OR GENERAL SUPPLY STERILE: Performed by: OBSTETRICS & GYNECOLOGY

## 2023-09-25 PROCEDURE — 250N000011 HC RX IP 250 OP 636: Performed by: NURSE ANESTHETIST, CERTIFIED REGISTERED

## 2023-09-25 PROCEDURE — 250N000009 HC RX 250

## 2023-09-25 PROCEDURE — 86850 RBC ANTIBODY SCREEN: CPT | Performed by: OBSTETRICS & GYNECOLOGY

## 2023-09-25 PROCEDURE — 360N000076 HC SURGERY LEVEL 3, PER MIN: Performed by: OBSTETRICS & GYNECOLOGY

## 2023-09-25 PROCEDURE — 258N000003 HC RX IP 258 OP 636: Performed by: ANESTHESIOLOGY

## 2023-09-25 PROCEDURE — 85461 HEMOGLOBIN FETAL: CPT | Performed by: OBSTETRICS & GYNECOLOGY

## 2023-09-25 PROCEDURE — 258N000003 HC RX IP 258 OP 636: Performed by: NURSE ANESTHETIST, CERTIFIED REGISTERED

## 2023-09-25 PROCEDURE — 370N000017 HC ANESTHESIA TECHNICAL FEE, PER MIN: Performed by: OBSTETRICS & GYNECOLOGY

## 2023-09-25 PROCEDURE — 88307 TISSUE EXAM BY PATHOLOGIST: CPT | Mod: TC | Performed by: OBSTETRICS & GYNECOLOGY

## 2023-09-25 PROCEDURE — 36415 COLL VENOUS BLD VENIPUNCTURE: CPT | Performed by: OBSTETRICS & GYNECOLOGY

## 2023-09-25 PROCEDURE — 86780 TREPONEMA PALLIDUM: CPT | Performed by: OBSTETRICS & GYNECOLOGY

## 2023-09-25 PROCEDURE — 120N000001 HC R&B MED SURG/OB

## 2023-09-25 PROCEDURE — 250N000011 HC RX IP 250 OP 636: Mod: JZ | Performed by: ANESTHESIOLOGY

## 2023-09-25 PROCEDURE — 59510 CESAREAN DELIVERY: CPT | Performed by: OBSTETRICS & GYNECOLOGY

## 2023-09-25 RX ORDER — MISOPROSTOL 200 UG/1
400 TABLET ORAL
Status: DISCONTINUED | OUTPATIENT
Start: 2023-09-25 | End: 2023-09-28 | Stop reason: HOSPADM

## 2023-09-25 RX ORDER — KETOROLAC TROMETHAMINE 30 MG/ML
INJECTION, SOLUTION INTRAMUSCULAR; INTRAVENOUS PRN
Status: DISCONTINUED | OUTPATIENT
Start: 2023-09-25 | End: 2023-09-25

## 2023-09-25 RX ORDER — OXYTOCIN 10 [USP'U]/ML
10 INJECTION, SOLUTION INTRAMUSCULAR; INTRAVENOUS
Status: DISCONTINUED | OUTPATIENT
Start: 2023-09-25 | End: 2023-09-25 | Stop reason: HOSPADM

## 2023-09-25 RX ORDER — CEFAZOLIN SODIUM/WATER 2 G/20 ML
2 SYRINGE (ML) INTRAVENOUS SEE ADMIN INSTRUCTIONS
Status: DISCONTINUED | OUTPATIENT
Start: 2023-09-25 | End: 2023-09-25 | Stop reason: HOSPADM

## 2023-09-25 RX ORDER — SIMETHICONE 80 MG
80 TABLET,CHEWABLE ORAL 4 TIMES DAILY PRN
Status: DISCONTINUED | OUTPATIENT
Start: 2023-09-25 | End: 2023-09-28 | Stop reason: HOSPADM

## 2023-09-25 RX ORDER — METOCLOPRAMIDE HYDROCHLORIDE 5 MG/ML
10 INJECTION INTRAMUSCULAR; INTRAVENOUS EVERY 6 HOURS PRN
Status: DISCONTINUED | OUTPATIENT
Start: 2023-09-25 | End: 2023-09-28 | Stop reason: HOSPADM

## 2023-09-25 RX ORDER — FENTANYL CITRATE-0.9 % NACL/PF 10 MCG/ML
100 PLASTIC BAG, INJECTION (ML) INTRAVENOUS EVERY 5 MIN PRN
Status: COMPLETED | OUTPATIENT
Start: 2023-09-25 | End: 2023-09-25

## 2023-09-25 RX ORDER — SODIUM CHLORIDE, SODIUM LACTATE, POTASSIUM CHLORIDE, CALCIUM CHLORIDE 600; 310; 30; 20 MG/100ML; MG/100ML; MG/100ML; MG/100ML
INJECTION, SOLUTION INTRAVENOUS CONTINUOUS
Status: DISCONTINUED | OUTPATIENT
Start: 2023-09-25 | End: 2023-09-25 | Stop reason: HOSPADM

## 2023-09-25 RX ORDER — OXYCODONE HYDROCHLORIDE 5 MG/1
5 TABLET ORAL EVERY 4 HOURS PRN
Status: DISCONTINUED | OUTPATIENT
Start: 2023-09-25 | End: 2023-09-28 | Stop reason: HOSPADM

## 2023-09-25 RX ORDER — NALOXONE HYDROCHLORIDE 0.4 MG/ML
0.2 INJECTION, SOLUTION INTRAMUSCULAR; INTRAVENOUS; SUBCUTANEOUS
Status: DISCONTINUED | OUTPATIENT
Start: 2023-09-25 | End: 2023-09-28 | Stop reason: HOSPADM

## 2023-09-25 RX ORDER — NALOXONE HYDROCHLORIDE 0.4 MG/ML
0.4 INJECTION, SOLUTION INTRAMUSCULAR; INTRAVENOUS; SUBCUTANEOUS
Status: DISCONTINUED | OUTPATIENT
Start: 2023-09-25 | End: 2023-09-28 | Stop reason: HOSPADM

## 2023-09-25 RX ORDER — HYDROCORTISONE 25 MG/G
CREAM TOPICAL 3 TIMES DAILY PRN
Status: DISCONTINUED | OUTPATIENT
Start: 2023-09-25 | End: 2023-09-28 | Stop reason: HOSPADM

## 2023-09-25 RX ORDER — OXYCODONE HYDROCHLORIDE 10 MG/1
10 TABLET ORAL
Status: DISCONTINUED | OUTPATIENT
Start: 2023-09-25 | End: 2023-09-25 | Stop reason: HOSPADM

## 2023-09-25 RX ORDER — ONDANSETRON 2 MG/ML
4 INJECTION INTRAMUSCULAR; INTRAVENOUS EVERY 30 MIN PRN
Status: DISCONTINUED | OUTPATIENT
Start: 2023-09-25 | End: 2023-09-25 | Stop reason: HOSPADM

## 2023-09-25 RX ORDER — KETOROLAC TROMETHAMINE 30 MG/ML
30 INJECTION, SOLUTION INTRAMUSCULAR; INTRAVENOUS EVERY 6 HOURS
Status: DISPENSED | OUTPATIENT
Start: 2023-09-25 | End: 2023-09-26

## 2023-09-25 RX ORDER — OXYTOCIN 10 [USP'U]/ML
10 INJECTION, SOLUTION INTRAMUSCULAR; INTRAVENOUS
Status: DISCONTINUED | OUTPATIENT
Start: 2023-09-25 | End: 2023-09-25

## 2023-09-25 RX ORDER — FENTANYL CITRATE 50 UG/ML
INJECTION, SOLUTION INTRAMUSCULAR; INTRAVENOUS PRN
Status: DISCONTINUED | OUTPATIENT
Start: 2023-09-25 | End: 2023-09-25

## 2023-09-25 RX ORDER — OXYTOCIN/0.9 % SODIUM CHLORIDE 30/500 ML
100-340 PLASTIC BAG, INJECTION (ML) INTRAVENOUS CONTINUOUS PRN
Status: DISCONTINUED | OUTPATIENT
Start: 2023-09-25 | End: 2023-09-25

## 2023-09-25 RX ORDER — CARBOPROST TROMETHAMINE 250 UG/ML
250 INJECTION, SOLUTION INTRAMUSCULAR
Status: DISCONTINUED | OUTPATIENT
Start: 2023-09-25 | End: 2023-09-25 | Stop reason: HOSPADM

## 2023-09-25 RX ORDER — MISOPROSTOL 200 UG/1
400 TABLET ORAL
Status: DISCONTINUED | OUTPATIENT
Start: 2023-09-25 | End: 2023-09-25 | Stop reason: HOSPADM

## 2023-09-25 RX ORDER — MORPHINE SULFATE 1 MG/ML
INJECTION, SOLUTION EPIDURAL; INTRATHECAL; INTRAVENOUS PRN
Status: DISCONTINUED | OUTPATIENT
Start: 2023-09-25 | End: 2023-09-25

## 2023-09-25 RX ORDER — SODIUM CHLORIDE, SODIUM LACTATE, POTASSIUM CHLORIDE, CALCIUM CHLORIDE 600; 310; 30; 20 MG/100ML; MG/100ML; MG/100ML; MG/100ML
INJECTION, SOLUTION INTRAVENOUS CONTINUOUS PRN
Status: DISCONTINUED | OUTPATIENT
Start: 2023-09-25 | End: 2023-09-25

## 2023-09-25 RX ORDER — FERROUS GLUCONATE 324(38)MG
324 TABLET ORAL
COMMUNITY
End: 2023-11-07

## 2023-09-25 RX ORDER — ACETAMINOPHEN 325 MG/1
975 TABLET ORAL ONCE
Status: COMPLETED | OUTPATIENT
Start: 2023-09-25 | End: 2023-09-25

## 2023-09-25 RX ORDER — OXYTOCIN 10 [USP'U]/ML
10 INJECTION, SOLUTION INTRAMUSCULAR; INTRAVENOUS
Status: DISCONTINUED | OUTPATIENT
Start: 2023-09-25 | End: 2023-09-28 | Stop reason: HOSPADM

## 2023-09-25 RX ORDER — CITRIC ACID/SODIUM CITRATE 334-500MG
30 SOLUTION, ORAL ORAL
Status: COMPLETED | OUTPATIENT
Start: 2023-09-25 | End: 2023-09-25

## 2023-09-25 RX ORDER — MODIFIED LANOLIN
OINTMENT (GRAM) TOPICAL
Status: DISCONTINUED | OUTPATIENT
Start: 2023-09-25 | End: 2023-09-28 | Stop reason: HOSPADM

## 2023-09-25 RX ORDER — OXYCODONE HYDROCHLORIDE 5 MG/1
5 TABLET ORAL
Status: DISCONTINUED | OUTPATIENT
Start: 2023-09-25 | End: 2023-09-25 | Stop reason: HOSPADM

## 2023-09-25 RX ORDER — BUPIVACAINE HYDROCHLORIDE 7.5 MG/ML
INJECTION, SOLUTION INTRASPINAL PRN
Status: DISCONTINUED | OUTPATIENT
Start: 2023-09-25 | End: 2023-09-25

## 2023-09-25 RX ORDER — ONDANSETRON 4 MG/1
4 TABLET, ORALLY DISINTEGRATING ORAL EVERY 6 HOURS PRN
Status: DISCONTINUED | OUTPATIENT
Start: 2023-09-25 | End: 2023-09-28 | Stop reason: HOSPADM

## 2023-09-25 RX ORDER — NALBUPHINE HYDROCHLORIDE 20 MG/ML
2.5-5 INJECTION, SOLUTION INTRAMUSCULAR; INTRAVENOUS; SUBCUTANEOUS EVERY 6 HOURS PRN
Status: DISCONTINUED | OUTPATIENT
Start: 2023-09-25 | End: 2023-09-28 | Stop reason: HOSPADM

## 2023-09-25 RX ORDER — IBUPROFEN 800 MG/1
800 TABLET, FILM COATED ORAL EVERY 6 HOURS
Status: DISCONTINUED | OUTPATIENT
Start: 2023-09-26 | End: 2023-09-28 | Stop reason: HOSPADM

## 2023-09-25 RX ORDER — OXYTOCIN/0.9 % SODIUM CHLORIDE 30/500 ML
340 PLASTIC BAG, INJECTION (ML) INTRAVENOUS CONTINUOUS PRN
Status: DISCONTINUED | OUTPATIENT
Start: 2023-09-25 | End: 2023-09-25 | Stop reason: HOSPADM

## 2023-09-25 RX ORDER — LIDOCAINE 40 MG/G
CREAM TOPICAL
Status: COMPLETED
Start: 2023-09-25 | End: 2023-09-25

## 2023-09-25 RX ORDER — DEXAMETHASONE SODIUM PHOSPHATE 4 MG/ML
4 INJECTION, SOLUTION INTRA-ARTICULAR; INTRALESIONAL; INTRAMUSCULAR; INTRAVENOUS; SOFT TISSUE ONCE
Status: COMPLETED | OUTPATIENT
Start: 2023-09-25 | End: 2023-09-25

## 2023-09-25 RX ORDER — ONDANSETRON 2 MG/ML
4 INJECTION INTRAMUSCULAR; INTRAVENOUS EVERY 6 HOURS PRN
Status: DISCONTINUED | OUTPATIENT
Start: 2023-09-25 | End: 2023-09-28 | Stop reason: HOSPADM

## 2023-09-25 RX ORDER — TRANEXAMIC ACID 10 MG/ML
1 INJECTION, SOLUTION INTRAVENOUS EVERY 30 MIN PRN
Status: DISCONTINUED | OUTPATIENT
Start: 2023-09-25 | End: 2023-09-28 | Stop reason: HOSPADM

## 2023-09-25 RX ORDER — TRANEXAMIC ACID 10 MG/ML
1 INJECTION, SOLUTION INTRAVENOUS EVERY 30 MIN PRN
Status: DISCONTINUED | OUTPATIENT
Start: 2023-09-25 | End: 2023-09-25 | Stop reason: HOSPADM

## 2023-09-25 RX ORDER — AMOXICILLIN 250 MG
1 CAPSULE ORAL 2 TIMES DAILY
Status: DISCONTINUED | OUTPATIENT
Start: 2023-09-25 | End: 2023-09-28 | Stop reason: HOSPADM

## 2023-09-25 RX ORDER — METHYLERGONOVINE MALEATE 0.2 MG/ML
200 INJECTION INTRAVENOUS
Status: DISCONTINUED | OUTPATIENT
Start: 2023-09-25 | End: 2023-09-28 | Stop reason: HOSPADM

## 2023-09-25 RX ORDER — CEFAZOLIN SODIUM 1 G/3ML
INJECTION, POWDER, FOR SOLUTION INTRAMUSCULAR; INTRAVENOUS PRN
Status: DISCONTINUED | OUTPATIENT
Start: 2023-09-25 | End: 2023-09-25

## 2023-09-25 RX ORDER — LIDOCAINE 40 MG/G
CREAM TOPICAL
Status: DISCONTINUED | OUTPATIENT
Start: 2023-09-25 | End: 2023-09-28 | Stop reason: HOSPADM

## 2023-09-25 RX ORDER — METOCLOPRAMIDE 10 MG/1
10 TABLET ORAL EVERY 6 HOURS PRN
Status: DISCONTINUED | OUTPATIENT
Start: 2023-09-25 | End: 2023-09-28 | Stop reason: HOSPADM

## 2023-09-25 RX ORDER — OXYTOCIN/0.9 % SODIUM CHLORIDE 30/500 ML
PLASTIC BAG, INJECTION (ML) INTRAVENOUS PRN
Status: DISCONTINUED | OUTPATIENT
Start: 2023-09-25 | End: 2023-09-25

## 2023-09-25 RX ORDER — LIDOCAINE 40 MG/G
CREAM TOPICAL
Status: DISCONTINUED | OUTPATIENT
Start: 2023-09-25 | End: 2023-09-25 | Stop reason: HOSPADM

## 2023-09-25 RX ORDER — OXYTOCIN/0.9 % SODIUM CHLORIDE 30/500 ML
340 PLASTIC BAG, INJECTION (ML) INTRAVENOUS CONTINUOUS PRN
Status: DISCONTINUED | OUTPATIENT
Start: 2023-09-25 | End: 2023-09-28 | Stop reason: HOSPADM

## 2023-09-25 RX ORDER — ACETAMINOPHEN 325 MG/1
975 TABLET ORAL EVERY 6 HOURS
Status: DISCONTINUED | OUTPATIENT
Start: 2023-09-25 | End: 2023-09-28 | Stop reason: HOSPADM

## 2023-09-25 RX ORDER — CEFAZOLIN SODIUM/WATER 2 G/20 ML
2 SYRINGE (ML) INTRAVENOUS
Status: DISCONTINUED | OUTPATIENT
Start: 2023-09-25 | End: 2023-09-25 | Stop reason: HOSPADM

## 2023-09-25 RX ORDER — FENTANYL CITRATE 50 UG/ML
50 INJECTION, SOLUTION INTRAMUSCULAR; INTRAVENOUS EVERY 5 MIN PRN
Status: DISCONTINUED | OUTPATIENT
Start: 2023-09-25 | End: 2023-09-25 | Stop reason: HOSPADM

## 2023-09-25 RX ORDER — PROCHLORPERAZINE 25 MG
25 SUPPOSITORY, RECTAL RECTAL EVERY 12 HOURS PRN
Status: DISCONTINUED | OUTPATIENT
Start: 2023-09-25 | End: 2023-09-28 | Stop reason: HOSPADM

## 2023-09-25 RX ORDER — PROMETHAZINE HYDROCHLORIDE 25 MG/ML
6.25 INJECTION INTRAMUSCULAR; INTRAVENOUS EVERY 4 HOURS PRN
Status: DISCONTINUED | OUTPATIENT
Start: 2023-09-25 | End: 2023-09-28 | Stop reason: HOSPADM

## 2023-09-25 RX ORDER — CARBOPROST TROMETHAMINE 250 UG/ML
250 INJECTION, SOLUTION INTRAMUSCULAR
Status: DISCONTINUED | OUTPATIENT
Start: 2023-09-25 | End: 2023-09-28 | Stop reason: HOSPADM

## 2023-09-25 RX ORDER — MISOPROSTOL 200 UG/1
800 TABLET ORAL
Status: DISCONTINUED | OUTPATIENT
Start: 2023-09-25 | End: 2023-09-28 | Stop reason: HOSPADM

## 2023-09-25 RX ORDER — ONDANSETRON 4 MG/1
4 TABLET, ORALLY DISINTEGRATING ORAL EVERY 30 MIN PRN
Status: DISCONTINUED | OUTPATIENT
Start: 2023-09-25 | End: 2023-09-25 | Stop reason: HOSPADM

## 2023-09-25 RX ORDER — PROCHLORPERAZINE MALEATE 10 MG
10 TABLET ORAL EVERY 6 HOURS PRN
Status: DISCONTINUED | OUTPATIENT
Start: 2023-09-25 | End: 2023-09-28 | Stop reason: HOSPADM

## 2023-09-25 RX ORDER — BISACODYL 10 MG
10 SUPPOSITORY, RECTAL RECTAL DAILY PRN
Status: DISCONTINUED | OUTPATIENT
Start: 2023-09-27 | End: 2023-09-28 | Stop reason: HOSPADM

## 2023-09-25 RX ORDER — AMOXICILLIN 250 MG
2 CAPSULE ORAL 2 TIMES DAILY
Status: DISCONTINUED | OUTPATIENT
Start: 2023-09-25 | End: 2023-09-28 | Stop reason: HOSPADM

## 2023-09-25 RX ORDER — DEXTROSE, SODIUM CHLORIDE, SODIUM LACTATE, POTASSIUM CHLORIDE, AND CALCIUM CHLORIDE 5; .6; .31; .03; .02 G/100ML; G/100ML; G/100ML; G/100ML; G/100ML
INJECTION, SOLUTION INTRAVENOUS CONTINUOUS
Status: DISCONTINUED | OUTPATIENT
Start: 2023-09-25 | End: 2023-09-28 | Stop reason: HOSPADM

## 2023-09-25 RX ORDER — FENTANYL CITRATE 50 UG/ML
25 INJECTION, SOLUTION INTRAMUSCULAR; INTRAVENOUS EVERY 5 MIN PRN
Status: DISCONTINUED | OUTPATIENT
Start: 2023-09-25 | End: 2023-09-25 | Stop reason: HOSPADM

## 2023-09-25 RX ORDER — ONDANSETRON 2 MG/ML
INJECTION INTRAMUSCULAR; INTRAVENOUS PRN
Status: DISCONTINUED | OUTPATIENT
Start: 2023-09-25 | End: 2023-09-25

## 2023-09-25 RX ORDER — SCOLOPAMINE TRANSDERMAL SYSTEM 1 MG/1
1 PATCH, EXTENDED RELEASE TRANSDERMAL
Status: DISCONTINUED | OUTPATIENT
Start: 2023-09-25 | End: 2023-09-28 | Stop reason: HOSPADM

## 2023-09-25 RX ORDER — METHYLERGONOVINE MALEATE 0.2 MG/ML
200 INJECTION INTRAVENOUS
Status: DISCONTINUED | OUTPATIENT
Start: 2023-09-25 | End: 2023-09-25 | Stop reason: HOSPADM

## 2023-09-25 RX ORDER — MISOPROSTOL 200 UG/1
800 TABLET ORAL
Status: DISCONTINUED | OUTPATIENT
Start: 2023-09-25 | End: 2023-09-25 | Stop reason: HOSPADM

## 2023-09-25 RX ADMIN — MIDAZOLAM 2 MG: 1 INJECTION INTRAMUSCULAR; INTRAVENOUS at 13:05

## 2023-09-25 RX ADMIN — PHENYLEPHRINE HYDROCHLORIDE 50 MCG/MIN: 10 INJECTION INTRAVENOUS at 12:48

## 2023-09-25 RX ADMIN — LIDOCAINE 4 %: 40 CREAM TOPICAL at 10:32

## 2023-09-25 RX ADMIN — PHENYLEPHRINE HYDROCHLORIDE 100 MCG: 10 INJECTION INTRAVENOUS at 12:48

## 2023-09-25 RX ADMIN — Medication 100 MCG: at 14:12

## 2023-09-25 RX ADMIN — SODIUM CHLORIDE, SODIUM LACTATE, POTASSIUM CHLORIDE, CALCIUM CHLORIDE AND DEXTROSE MONOHYDRATE: 5; 600; 310; 30; 20 INJECTION, SOLUTION INTRAVENOUS at 20:54

## 2023-09-25 RX ADMIN — SODIUM CHLORIDE, SODIUM LACTATE, POTASSIUM CHLORIDE, CALCIUM CHLORIDE AND DEXTROSE MONOHYDRATE: 5; 600; 310; 30; 20 INJECTION, SOLUTION INTRAVENOUS at 15:03

## 2023-09-25 RX ADMIN — OXYTOCIN-SODIUM CHLORIDE 0.9% IV SOLN 30 UNIT/500ML 100 ML: 30-0.9/5 SOLUTION at 13:02

## 2023-09-25 RX ADMIN — SODIUM CHLORIDE, POTASSIUM CHLORIDE, SODIUM LACTATE AND CALCIUM CHLORIDE 250 ML: 600; 310; 30; 20 INJECTION, SOLUTION INTRAVENOUS at 14:06

## 2023-09-25 RX ADMIN — OXYTOCIN-SODIUM CHLORIDE 0.9% IV SOLN 30 UNIT/500ML 100 ML: 30-0.9/5 SOLUTION at 13:29

## 2023-09-25 RX ADMIN — SODIUM CITRATE AND CITRIC ACID MONOHYDRATE 30 ML: 500; 334 SOLUTION ORAL at 11:09

## 2023-09-25 RX ADMIN — Medication 100 MCG: at 14:17

## 2023-09-25 RX ADMIN — MORPHINE SULFATE 150 MCG: 1 INJECTION EPIDURAL; INTRATHECAL; INTRAVENOUS at 12:49

## 2023-09-25 RX ADMIN — ACETAMINOPHEN 975 MG: 325 TABLET, FILM COATED ORAL at 17:54

## 2023-09-25 RX ADMIN — SCOPALAMINE 1 PATCH: 1 PATCH, EXTENDED RELEASE TRANSDERMAL at 15:24

## 2023-09-25 RX ADMIN — SENNOSIDES AND DOCUSATE SODIUM 2 TABLET: 8.6; 5 TABLET ORAL at 21:45

## 2023-09-25 RX ADMIN — KETOROLAC TROMETHAMINE 30 MG: 30 INJECTION, SOLUTION INTRAMUSCULAR at 13:13

## 2023-09-25 RX ADMIN — FENTANYL CITRATE 20 MCG: 50 INJECTION, SOLUTION INTRAMUSCULAR; INTRAVENOUS at 12:49

## 2023-09-25 RX ADMIN — SODIUM CHLORIDE, POTASSIUM CHLORIDE, SODIUM LACTATE AND CALCIUM CHLORIDE: 600; 310; 30; 20 INJECTION, SOLUTION INTRAVENOUS at 12:43

## 2023-09-25 RX ADMIN — SODIUM CHLORIDE, POTASSIUM CHLORIDE, SODIUM LACTATE AND CALCIUM CHLORIDE: 600; 310; 30; 20 INJECTION, SOLUTION INTRAVENOUS at 10:58

## 2023-09-25 RX ADMIN — DEXAMETHASONE SODIUM PHOSPHATE 4 MG: 4 INJECTION, SOLUTION INTRAMUSCULAR; INTRAVENOUS at 15:39

## 2023-09-25 RX ADMIN — BUPIVACAINE HYDROCHLORIDE IN DEXTROSE 13.5 MG: 7.5 INJECTION, SOLUTION SUBARACHNOID at 12:49

## 2023-09-25 RX ADMIN — ONDANSETRON 4 MG: 2 INJECTION INTRAMUSCULAR; INTRAVENOUS at 12:48

## 2023-09-25 RX ADMIN — PROCHLORPERAZINE EDISYLATE 10 MG: 5 INJECTION INTRAMUSCULAR; INTRAVENOUS at 15:16

## 2023-09-25 RX ADMIN — KETOROLAC TROMETHAMINE 30 MG: 30 INJECTION, SOLUTION INTRAMUSCULAR; INTRAVENOUS at 21:44

## 2023-09-25 RX ADMIN — SODIUM CHLORIDE, POTASSIUM CHLORIDE, SODIUM LACTATE AND CALCIUM CHLORIDE: 600; 310; 30; 20 INJECTION, SOLUTION INTRAVENOUS at 13:29

## 2023-09-25 RX ADMIN — CEFAZOLIN 2 G: 1 INJECTION, POWDER, FOR SOLUTION INTRAMUSCULAR; INTRAVENOUS at 12:43

## 2023-09-25 RX ADMIN — ACETAMINOPHEN 975 MG: 325 TABLET, FILM COATED ORAL at 11:08

## 2023-09-25 RX ADMIN — Medication 100 MCG: at 14:28

## 2023-09-25 RX ADMIN — Medication 100 MCG: at 14:23

## 2023-09-25 ASSESSMENT — ACTIVITIES OF DAILY LIVING (ADL)
ADLS_ACUITY_SCORE: 18
ADLS_ACUITY_SCORE: 24
ADLS_ACUITY_SCORE: 26
ADLS_ACUITY_SCORE: 18
ADLS_ACUITY_SCORE: 18

## 2023-09-25 NOTE — PLAN OF CARE
Data: Carri Sauer transferred to [facility] via wheelchair at 1600. Baby transferred via crib.  Action: Receiving unit notified of transfer: Yes. Patient and family notified of room change. Report given to Britt at 1600. Belongings sent to receiving unit. Accompanied by Registered Nurse. Oriented patient to surroundings. Call light within reach. ID bands double-checked with receiving RN.  Response: Patient tolerated transfer and is stable.  Goal Outcome Evaluation:

## 2023-09-25 NOTE — L&D DELIVERY NOTE
"Delivery Summary    38 year old  at 39w2d with breech presentation, suspected fetal macrosomia, NIPT testing positive for T21, history of molar pregnancy who presents for scheduled primary  delivery.  Risks and benefits were reviewed and the patient consented for the procedure.    Complications: None apparent    Findings: A single vigorous, liveborn male weighing 8 lb 12 oz with apgars of 8 and 8, facies of trisomy 21. Normal appearing uterus, fallopian tubes, ovaries.  No nuchal cord.  Clear amniontic fluid.  No fascial adhesions.  No intraabdominal adhesions from the uterus to the bladder and peritoneum.  Undecided on name.    Va Loaiza MD, MPH  [facility]           Radha Sauer-Carri [[id number]]      Labor Length      3rd Stage (hrs): 0 (min): 2          Delivery/Placenta Date and Time      Delivery Date: 23 Delivery Time:  1:00 PM   Placenta Date/Time: 2023  1:02 PM  Delivering clinician: Va Loaiza MD   Other personnel present at delivery:  Provider Role   David Rodríguez RN              Apgars    Living status: Living   1 Minute 5 Minute 10 Minute 15 Minute 20 Minute   Skin color: 1  1       Heart rate: 2  2       Reflex irritability: 2  2       Muscle tone: 1  1       Respiratory effort: 2  2       Total: 8  8       Apgars assigned by: DVAID CABEZAS       Cord      Vessels: 3 Vessels    Cord Complications: None               Cord Blood Disposition: Lab    Gases Sent?: No    Delayed cord clamping?: Yes    Cord Clamping Delay (seconds):  seconds    Stem cell collection?: No            Measurements      Weight: 8 lb 12.4 oz Length: 1' 8.5\"     Head circumference: 38.1 cm           Delivery (Maternal) (Provider to Complete) ([id number])           Blood Loss  Mother: Kia Sauer #[id number]     Start of Mother's Information      Delivery Blood Loss  23 1259 - 23 1433      Total Surgical QBL Blood Loss (mL) Hospital " Encounter 629 mL    Total  629 mL               End of Mother's Information  Mother: Kia Sauer #[id number]                Delivery - Provider to Complete ([id number])    Delivering clinician: Va Loaiza MD  Delivery Type (Choose the 1 that will go to the Birth History): , Low Transverse                          Priority: Scheduled      Specifics: Primary multiparous     Indications for : Malpresentation     Other personnel:  Provider Role   Mary Ellen Rodríguez RN                     Placenta    Date/Time: 2023  1:02 PM  Removal: Manual Removal  Disposition: Pathology             Presentation and Position    Presentation: Breech                    Va Loaiza MD

## 2023-09-25 NOTE — OP NOTE
Carri Sauer    1985   MRN [id number]     Operative Note     Date of Operation: 2023   Surgeon: Va Loaiza MD     Pre-operative diagnosis: IUP at 39w2d, breech presentation, NIPT +T21, history of molar pregnancy  Post-operative diagnosis: same, delivered     Procedures: primary lower transverse  section with double layer uterine closure via Pfannenstiel incision     Anesthesia: spinal  IVF: see anesthesia record  UOP: see anesthesia record  EBL: 629cc    Indications: 38 year old  at 39w2d with breech presentation, suspected fetal macrosomia, NIPT testing positive for T21, history of molar pregnancy who presents for scheduled primary  delivery.  Risks and benefits were reviewed and the patient consented for the procedure.    Complications: None apparent    Findings: A single vigorous, liveborn male weighing 8 lb 12 oz with apgars of 8 and 8, facies of trisomy 21. Normal appearing uterus, fallopian tubes, ovaries.  No nuchal cord.  Clear amniontic fluid.  No fascial adhesions.  No intraabdominal adhesions from the uterus to the bladder and peritoneum.  Undecided on name.    Specimen: cord blood for FISH karyotype, cord blood for blood type, placenta for history of molar pregnancy    Procedure Details: The patient was taken back to the operating room where she underwent anesthesia. She was then prepped and draped in the usual sterile fashion in the dorsal supine position with a leftward tilt. A time out was performed. A pfannenstiel skin incision was made with the scalpel and carried through the underlying layer to the fascia. The fascia was incised in the midline and extended laterally. The rectus muscles were then  in the midline. The peritoneum was then identified and entered bluntly. This was extended with sharp and blunt dissection. The Gregorio O retractor was then inserted. The lower uterine segment was incised in a transverse fashion with the scalpel.  The incision was extended digitally. The infant's buttocks was delivered, the body rotated, and the rest of the infant was delivered atraumatically using standard breech maneuvers. The cord was clamped and cut and then the baby was handed off to the nursing staff.  The placenta was then removed manually. The uterus was cleared of all clots and debris. The uterine incision was repaired with 0-Vicryl in a running locked fashion. A second layer of 0-monocryl was used to imbricate the incision. The hysterotomy was noted to be hemostatic. The gutters were then cleared of clots. The rectus muscles and posterior fascia were inspected and good hemostasis was noted.  The fascia was closed with 0-vicryl in a running fashion. The subcutaneous tissue was irrigated and areas of bleeding were controlled with cautery. The subcutaneous tissue was closed with 3-0 plain. The skin was closed with 3-0 vicryl on a piper needle. The patient tolerated the procedure well and was taken to the recovery room in stable condition. All lap, instrument, and sharps counts were correct times two.      Va Loaiza MD, MPH  Obstetrics and Gynecology

## 2023-09-25 NOTE — PROVIDER NOTIFICATION
09/25/23 1432   Provider Notification   Provider Name/Title [email or web address]   Method of Notification Electronic Page;Phone  (updated of pt hr fluctuating from 40's to 60's, bp's 70//60's, last couple have been teterring from normotensive to hypotensive. pt baseline 's/60-70's. pt denies any s/s of lightheaded or dizzy.)     Pt has received x4 doses of phenylephrine, and fluid bolus started, dr parra infuse 1,000mL bolus of fluids iv, will come to bedside.

## 2023-09-25 NOTE — PLAN OF CARE
38year old  at 39.2 weeks gestation presents to L/lD for evaluation of scheduled primary c/s.  Patient reports good fetal movement, denies leaking of fluid, vaginal bleeding, and regular contractions. EFM and toco explained and applied. Health history obtained, physical assessment completed. Will update Dr.Ayers Loaiza and obtain further orders, orders are in signed and held.  Goal Outcome Evaluation:Now in pre-op stage

## 2023-09-25 NOTE — ANESTHESIA PREPROCEDURE EVALUATION
Anesthesia Pre-Procedure Evaluation    Patient: Carri Sauer   MRN: [id number] : 1985        Procedure : Procedure(s):   SECTION          Past Medical History:   Diagnosis Date    Anxiety     ASCUS with positive high risk HPV cervical 2010 ASCUS, +HR HPV (care everywhere)    Needle phobia       Past Surgical History:   Procedure Laterality Date    GYN SURGERY      D&C     [place] BREAST AUGMENTATION Bilateral       No Known Allergies   Social History     Tobacco Use    Smoking status: Never    Smokeless tobacco: Never   Substance Use Topics    Alcohol use: Not Currently      Wt Readings from Last 1 Encounters:   23 79.4 kg (175 lb)        Anesthesia Evaluation            ROS/MED HX  ENT/Pulmonary:  - neg pulmonary ROS     Neurologic:  - neg neurologic ROS     Cardiovascular:  - neg cardiovascular ROS     METS/Exercise Tolerance: >4 METS    Hematologic:  - neg hematologic  ROS     Musculoskeletal:  - neg musculoskeletal ROS     GI/Hepatic:  - neg GI/hepatic ROS     Renal/Genitourinary:  - neg Renal ROS     Endo:  - neg endo ROS     Psychiatric/Substance Use:  - neg psychiatric ROS     Infectious Disease:  - neg infectious disease ROS     Malignancy:  - neg malignancy ROS     Other:      (+) Possibly pregnant, , ,         Physical Exam    Airway        Mallampati: II    Neck ROM: full     Respiratory Devices and Support         Dental           Cardiovascular   cardiovascular exam normal       Rhythm and rate: regular     Pulmonary   pulmonary exam normal                OUTSIDE LABS:  CBC:   Lab Results   Component Value Date    WBC 7.3 2023    WBC 6.5 2023    HGB 12.8 2023    HGB 10.3 (L) 2023    HCT 30.2 (L) 2023    HCT 39.4 2023     2023     2023     BMP: No results found for: NA, POTASSIUM, CHLORIDE, CO2, BUN, CR, GLC  COAGS: No results found for: PTT, INR, FIBR  POC: No results found for: BGM, HCG,  HCGS  HEPATIC: No results found for: ALBUMIN, PROTTOTAL, ALT, AST, GGT, ALKPHOS, BILITOTAL, BILIDIRECT, REGINE  OTHER:   Lab Results   Component Value Date    TSH 1.96 10/17/2022       Anesthesia Plan    ASA Status:  2       Anesthesia Type: Spinal.              Consents    Anesthesia Plan(s) and associated risks, benefits, and realistic alternatives discussed. Questions answered and patient/representative(s) expressed understanding.     - Discussed:     - Discussed with:  Patient            Postoperative Care    Pain management: IV analgesics, Oral pain medications, Multi-modal analgesia, intrathecal morphine.   PONV prophylaxis: Ondansetron (or other 5HT-3), Dexamethasone or Solumedrol     Comments:                Chris Boss, DO

## 2023-09-25 NOTE — PROVIDER NOTIFICATION
"   09/25/23 1649   Provider Notification   Provider Name/Title Dr. Pierre   Method of Notification Electronic Page   Request Evaluate-Remote   Notification Reason Other     Md asked if we could not draw the rhogam lab and give a standard dose of rhogam but md stated, \" we cannot do that because she might not get enough coverage\".  "

## 2023-09-25 NOTE — PROVIDER NOTIFICATION
09/25/23 1157   Provider Notification   Provider Name/Title Dr. Edgar Loaiza   Method of Notification At Bedside  (verified via US breech position, contented for surgery & blood transfusion)

## 2023-09-25 NOTE — PLAN OF CARE
Patient is bonding well with baby boy. Patient is formula feeding. Tolerating drinking. Fundus is firm, midline, and at umbilicus. Lochia is scant, rubra, and patient denies having any clots. VSS. Passing gas, miller catheter in place. Tolerating pain with tylenol and toradol, rating at 3/10.  at bedside and supportive.

## 2023-09-25 NOTE — ANESTHESIA CARE TRANSFER NOTE
Patient: Carri Sauer    Procedure: Procedure(s):   SECTION       Diagnosis: Breech presentation, single or unspecified fetus [O32.1XX0]  Diagnosis Additional Information: No value filed.    Anesthesia Type:   Spinal     Note:    Oropharynx: oropharynx clear of all foreign objects and spontaneously breathing  Level of Consciousness: awake  Oxygen Supplementation: room air    Independent Airway: airway patency satisfactory and stable  Dentition: dentition unchanged  Vital Signs Stable: post-procedure vital signs reviewed and stable  Report to RN Given: handoff report given  Patient transferred to: Labor and Delivery  Comments: Pt to MAC-1 with significant other and baby boy.  Report to RN.  No questions or concerns at this time.  Handoff Report: Identifed the Patient, Identified the Reponsible Provider, Reviewed the pertinent medical history, Discussed the surgical course, Reviewed Intra-OP anesthesia mangement and issues during anesthesia, Set expectations for post-procedure period and Allowed opportunity for questions and acknowledgement of understanding      Vitals:  Vitals Value Taken Time   /72    Temp     Pulse 62    Resp 16    SpO2 93%        Electronically Signed By: JASMEET Epps CRNA  2023  1:39 PM

## 2023-09-26 LAB
HGB BLD-MCNC: 11.2 G/DL (ref 11.7–15.7)
PATH REPORT.COMMENTS IMP SPEC: NORMAL
PATH REPORT.COMMENTS IMP SPEC: NORMAL
PATH REPORT.FINAL DX SPEC: NORMAL
PATH REPORT.GROSS SPEC: NORMAL
PATH REPORT.MICROSCOPIC SPEC OTHER STN: NORMAL
PATH REPORT.MICROSCOPIC SPEC OTHER STN: NORMAL
PATH REPORT.RELEVANT HX SPEC: NORMAL
PHOTO IMAGE: NORMAL

## 2023-09-26 PROCEDURE — 250N000013 HC RX MED GY IP 250 OP 250 PS 637: Performed by: OBSTETRICS & GYNECOLOGY

## 2023-09-26 PROCEDURE — 85018 HEMOGLOBIN: CPT | Performed by: OBSTETRICS & GYNECOLOGY

## 2023-09-26 PROCEDURE — 88307 TISSUE EXAM BY PATHOLOGIST: CPT | Mod: 26 | Performed by: PATHOLOGY

## 2023-09-26 PROCEDURE — 36415 COLL VENOUS BLD VENIPUNCTURE: CPT | Performed by: OBSTETRICS & GYNECOLOGY

## 2023-09-26 PROCEDURE — 250N000011 HC RX IP 250 OP 636: Mod: JZ | Performed by: OBSTETRICS & GYNECOLOGY

## 2023-09-26 PROCEDURE — 120N000001 HC R&B MED SURG/OB

## 2023-09-26 RX ADMIN — ACETAMINOPHEN 975 MG: 325 TABLET, FILM COATED ORAL at 06:48

## 2023-09-26 RX ADMIN — KETOROLAC TROMETHAMINE 30 MG: 30 INJECTION, SOLUTION INTRAMUSCULAR; INTRAVENOUS at 03:51

## 2023-09-26 RX ADMIN — SENNOSIDES AND DOCUSATE SODIUM 1 TABLET: 50; 8.6 TABLET ORAL at 23:18

## 2023-09-26 RX ADMIN — NALBUPHINE HYDROCHLORIDE 5 MG: 20 INJECTION, SOLUTION INTRAMUSCULAR; INTRAVENOUS; SUBCUTANEOUS at 16:29

## 2023-09-26 RX ADMIN — NALBUPHINE HYDROCHLORIDE 2.5 MG: 20 INJECTION, SOLUTION INTRAMUSCULAR; INTRAVENOUS; SUBCUTANEOUS at 07:41

## 2023-09-26 RX ADMIN — ACETAMINOPHEN 975 MG: 325 TABLET, FILM COATED ORAL at 18:28

## 2023-09-26 RX ADMIN — IBUPROFEN 800 MG: 800 TABLET ORAL at 16:29

## 2023-09-26 RX ADMIN — ACETAMINOPHEN 975 MG: 325 TABLET, FILM COATED ORAL at 12:14

## 2023-09-26 RX ADMIN — HUMAN RHO(D) IMMUNE GLOBULIN 300 MCG: 1500 SOLUTION INTRAMUSCULAR; INTRAVENOUS at 00:36

## 2023-09-26 RX ADMIN — ACETAMINOPHEN 975 MG: 325 TABLET, FILM COATED ORAL at 00:35

## 2023-09-26 RX ADMIN — IBUPROFEN 800 MG: 800 TABLET ORAL at 23:18

## 2023-09-26 RX ADMIN — NALBUPHINE HYDROCHLORIDE 2.5 MG: 20 INJECTION, SOLUTION INTRAMUSCULAR; INTRAVENOUS; SUBCUTANEOUS at 08:22

## 2023-09-26 RX ADMIN — SENNOSIDES AND DOCUSATE SODIUM 2 TABLET: 8.6; 5 TABLET ORAL at 07:40

## 2023-09-26 ASSESSMENT — ACTIVITIES OF DAILY LIVING (ADL)
ADLS_ACUITY_SCORE: 24
ADLS_ACUITY_SCORE: 20
ADLS_ACUITY_SCORE: 24
ADLS_ACUITY_SCORE: 20
ADLS_ACUITY_SCORE: 20
ADLS_ACUITY_SCORE: 24
ADLS_ACUITY_SCORE: 20
ADLS_ACUITY_SCORE: 24
ADLS_ACUITY_SCORE: 20

## 2023-09-26 NOTE — PROGRESS NOTES
Postpartum progress note  2023     S: was in the NICU a while this morning. Pain is pretty well controlled. Lochia minimal. Ambulating without difficulty. Voiding without difficulty. Passing flatus, no BM yet.  Tolerating po intake.     PHYSICAL EXAM:   Vitals:    23 2144 23 0035 23 0346 23 0745   BP: 111/72 118/79 110/79 126/85   BP Location: Left arm Left arm Left arm Left arm   Patient Position: Semi-Mota's Semi-Mota's Semi-Mota's Semi-Mota's   Cuff Size: Adult Regular Adult Regular Adult Regular Adult Regular   Pulse: 66 66 69 67   Resp: 18 16 16 16   Temp: 97.7  F (36.5  C) 98.7  F (37.1  C) 97.9  F (36.6  C) 97.7  F (36.5  C)   TempSrc: Axillary Oral Oral Oral   SpO2: 98% 93% 100% 98%   Weight:       Height:           General: sitting up, alert and cooperative  Abd: soft, non-distended, non-tender. Fundus firm, nontender, 2 cm below umbilicus.   Incision clean/dry/intact with pressure dressing in place.  Extremities: calves nontender, trace edema of lower extremities bilaterally    Vitals:    23 1025   Weight: 79.4 kg (175 lb)       Lab Results   Component Value Date    HGB 11.2 2023    HGB 12.8 2023    HGB 12.6 2021    HGB 12.0 2010     Blood type:   Lab Results   Component Value Date    RH Neg 2021         ASSESSMENT: 38 year old  POD 1 s/p PLTCS for malpresentation.     PLAN:  - post op: recommend removal of bandage this AM  - h/o molar pregnancy, placenta pathology pending.  Plan HCG at 6 weeks.  - NIPT +T21, UTDA2-3, peds aware. FISH karyotype result pending.  Planning echo.  Baby in NICU for desaturations while feeding.    - Heme: 12.8 > 11.2, no s/s ABLA  - Feeding: bottle  - Birth control: not discussed   - Rh status: neg, baby rh pos, s/p Rhogam   - Rubella status: immune  - Dispo: home POD2-4 when meeting post op goals and pending baby status    Va Loaiza MD, MPH  [facility]

## 2023-09-26 NOTE — PLAN OF CARE
Tolerates eating and drinking. Fundus is firm, midline, and 1 cm below umbilicus. Lochia is scant, rubra, and patient denies having any clots. VSS. Voiding and passing gas. Ambulating independently. Tolerating pain with tylenol, toradol, and ibuprofen, rating at 1/10. C/S incision dressing removed, DWIGHT and approximated scant dried drainage. Baby boy in NICU.  at bedside and supportive.

## 2023-09-26 NOTE — CONSULTS
COPIED FROM BABY'S CHART:    INITIAL SOCIAL WORK NICU ASSESSMENT      DATA:      Reason for Social Work Consult: BabyClive admitted to the NICU at 39W 3D gestation due to desaturations with feeding & Trisomy 21 indicated.     Living Situation: Home with MIRANDA, Carri, АНДРЕЙ-Travis, & 13 year old big sister.      Social Support: Parents report a very strong support system with lots of family in the area. They shared maternal grandparents live ten minutes away & are supportive & involved.      Employment: MIRANDA works for a tech company & АНДРЕЙ is a . They both will get 12 weeks off for maternity/paternity leave & have been in touch with their employers.      Insurance: Parents plans to add baby to their insurance plan provided through their employer & were informed they have 30 days after baby's date of birth to call to add him.      Source of Financial Support: Parents employment      Mental Health History: Carri shared she has some anxiety. She does not currently take any medication or see a therapist.      History of Postpartum Mood Disorders: Denied prior postpartum mood disorders. SW discussed baby blues & postpartum depression. MOB was provided with pamphlet & SW discussed resources available if needed.      Chemical Health History: none noted    Baby Supplies: Parents report they have all needed baby items & no barriers in getting additional items when needed.      INTERVENTION:      ADRIAN completed chart review and collaborated with the multidisciplinary team.   Psychosocial Assessment   Introduction to NICU  role and scope of practice   Discussed NICU experience and gave NICU welcome card  Reviewed Hospital and Community Resources   Assessed Chemical Health History and Current Symptoms   Assessed Mental Health History and Current Symptoms   Identified stressors, barriers and family concerns   Provided support and active empathetic listening and validation.   Provided psychoeducation on   mood and anxiety disorders, assessed for any current symptoms or history    ASSESSMENT:      Coping: adequate      Affect: appropriate     Mood: stable, denied current concerns      Motivation/Ability to Access Services: motivated, independent in accessing services     Assessment of Support System:  stable, involved, appropriate,      Level of engagement with SW: Parents were both engaged & appropriate throughout the visit with SW. They voiced they prefer to reach out if needs arise & declined ongoing SW check in's.      Family and parent/infant interactions: Parents appear supportive of one another & were active in baby cares while SW was in the room.     Assessment of parental risk for PMAD: Higher than average risk due to unexpected NICU admission & history of anxiety.      Strengths: strong support system, willingness to accept help, stable housing, stable employment      Vulnerabilities:  none identified      Identified Barriers: none identified      PLAN:       SW met with parents to introduce SW & SW's role while Clive fallon is here in the NICU. They denied current needs or concerns. SW discussed resources available due to potential Trisomy 21 diagnosis. Parents shared they have reached out to the [employer] [place] & have been connected with resources. SW discussed Ambrosio's basket. Parents were provided a pamphlet on Ambrosio's Basket & voiced they will update SW if they decide they would like a referral made. SW will continue to follow & remains available to assist if needs arise.     YOGESH Quezada  [facility]  2023  11:07 AM

## 2023-09-26 NOTE — ANESTHESIA POSTPROCEDURE EVALUATION
Patient: Carri Sauer    Procedure: Procedure(s):   SECTION       Anesthesia Type:  Spinal    Note:     Postop Pain Control: Uneventful            Sign Out: Well controlled pain   PONV: No   Neuro/Psych: Uneventful            Sign Out: Acceptable/Baseline neuro status   Airway/Respiratory:             Sign Out: Acceptable/Baseline resp. status   CV/Hemodynamics:             Sign Out: Acceptable CV status   Other NRE:    DID A NON-ROUTINE EVENT OCCUR?     Event details/Postop Comments:  .Anticipate full return of neurologic function             Last vitals:  Vitals Value Taken Time   /65 23 1446   Temp 97.7  F (36.5  C) 23 1336   Pulse     Resp     SpO2 95 % 23 1446       Electronically Signed By: Chris Boss DO  2023  6:42 AM

## 2023-09-26 NOTE — PLAN OF CARE
Data: Vital signs within normal limits. Postpartum checks within normal limits, see flow record. Patient eating and drinking normally. Patient unable to empty bladder independently due to miller in place. Patient declined removing it until the AM. Plan will be to remove miller at 0630 and she will need to void by 1030, patient reports understanding. Patient advised to call for restroom assist for first void. Patient was up an ambulated to restroom and tolerated that well.  No apparent signs of infection. Incision covered with foam dressing and is clean, dry, and intact. Patient declined ice. Patient performing self cares with minimal assist. Infant was transferred down to the NICU at 0100 due to desaturations with his oxygen while feeding. Ipad and NICU phone # bedside. Patient is formula feeding only. .  Action: Patient medicated during the shift for prevention of pain and cramping. See MAR. Patient reassessed within 1 hour after each medication patient stated she was comfortable. Patient denies pain all night and resting comfortably between cares. Patient education done about safe infant sleep, bulb syringe usage, breastfeeding and positions and satiety. See flow record. PP and  booklet given and briefly reviewed.   Response: Positive attachment behaviors observed with infant before transfer to NICU. Support person Travis present and attentive to infant and patient.   Plan: Anticipate discharge on 23.    Patients mobililty level scored using the bedside mobility assistance tool (BMAT). Patient is at a mobility level test number: 3. Mobility equipment used: none required. Required assist of 1 staff members. Further use of BMAT scoring required.    Goal Outcome Evaluation:      Plan of Care Reviewed With: patient, spouse    Overall Patient Progress: improvingOverall Patient Progress: improving

## 2023-09-27 PROCEDURE — 250N000013 HC RX MED GY IP 250 OP 250 PS 637: Performed by: OBSTETRICS & GYNECOLOGY

## 2023-09-27 PROCEDURE — 120N000001 HC R&B MED SURG/OB

## 2023-09-27 RX ADMIN — IBUPROFEN 800 MG: 800 TABLET ORAL at 11:14

## 2023-09-27 RX ADMIN — SENNOSIDES AND DOCUSATE SODIUM 2 TABLET: 8.6; 5 TABLET ORAL at 09:09

## 2023-09-27 RX ADMIN — IBUPROFEN 800 MG: 800 TABLET ORAL at 23:00

## 2023-09-27 RX ADMIN — IBUPROFEN 800 MG: 800 TABLET ORAL at 17:29

## 2023-09-27 RX ADMIN — SENNOSIDES AND DOCUSATE SODIUM 2 TABLET: 8.6; 5 TABLET ORAL at 22:14

## 2023-09-27 RX ADMIN — ACETAMINOPHEN 975 MG: 325 TABLET, FILM COATED ORAL at 11:14

## 2023-09-27 RX ADMIN — ACETAMINOPHEN 975 MG: 325 TABLET, FILM COATED ORAL at 17:29

## 2023-09-27 RX ADMIN — ACETAMINOPHEN 975 MG: 325 TABLET, FILM COATED ORAL at 05:17

## 2023-09-27 RX ADMIN — IBUPROFEN 800 MG: 800 TABLET ORAL at 05:17

## 2023-09-27 ASSESSMENT — ACTIVITIES OF DAILY LIVING (ADL)
ADLS_ACUITY_SCORE: 20
ADLS_ACUITY_SCORE: 20
ADLS_ACUITY_SCORE: 18
ADLS_ACUITY_SCORE: 20
ADLS_ACUITY_SCORE: 18
ADLS_ACUITY_SCORE: 20
ADLS_ACUITY_SCORE: 18
ADLS_ACUITY_SCORE: 20

## 2023-09-27 NOTE — PLAN OF CARE
Pt is bonding well with baby boy. Formula feeding. Tolerates eating and drinking.  Fundus is firm, midline and 2 cm below umbilicus. Lochia is scant, alba and pt denies having any clots. VSS. Voiding and passing gas. Ambulating independently. Tolerating incisional pain well with, tylenol and ibuprofen.- rating a 4/10.

## 2023-09-27 NOTE — PLAN OF CARE
Data: Vital signs within normal limits. Postpartum checks within normal limits - see flow record. Patient eating and drinking normally. Patient able to empty bladder independently and is up ambulating. No apparent signs of infection. Patient performing self cares.  Incision appears within normal. Is using Tylenol and Ibuprofen for mild discomfort.    Action: Patient medicated during the shift for cramping. See MAR. Patient education done, see flow record.  Response: Positive attachment behaviors observed with infant, down to NICU for 3 hours this evening. Patient's spouse present this shift.   Plan: Continue current plan of care.  Anticipate discharge on 9/28/23.

## 2023-09-27 NOTE — PROGRESS NOTES
"[facility] OB/GYN Postop C/S Note    S:  Patient without complaints other than typical soreness.  Minimal lochia.  Flatus passed, tolerating Regular diet well    O:  Blood pressure 116/80, pulse 61, temperature 98.3  F (36.8  C), temperature source Oral, resp. rate 16, height 1.626 m (5' 4\"), weight 79.4 kg (175 lb), last menstrual period 12/24/2022, SpO2 100 %, unknown if currently breastfeeding.          Intake/Output Summary (Last 24 hours) at 9/27/2023 0725  Last data filed at 9/26/2023 1617  Gross per 24 hour   Intake --   Output 950 ml   Net -950 ml           Abdomen - Non-distended, Normoactive bowel sounds, soft, appropriately tender; Fundus firm, at umbilicus, nontender        Dressing/Incision - clean, dry, intact        Extremities - No calf tenderness    Hemoglobin   Date Value Ref Range Status   09/26/2023 11.2 (L) 11.7 - 15.7 g/dL Final   09/25/2023 12.8 11.7 - 15.7 g/dL Final         A:   Postop Day# 2, s/p Primary LTCS         Baby w/ NIPT pos T21 - in NICU    P:  1)  Routine care        2)  Probable D/C tomorrow      Kevin Covarrubias MD            "

## 2023-09-27 NOTE — PROVIDER NOTIFICATION
09/27/23 0738   Provider Notification   Provider Name/Title Dr. Covarrubias   Method of Notification Phone   Request Evaluate in Person   Notification Reason Other     MD notified of yellow vaginal drainage, Md not concerned at this time.

## 2023-09-28 VITALS
BODY MASS INDEX: 27.74 KG/M2 | TEMPERATURE: 97.9 F | HEART RATE: 78 BPM | SYSTOLIC BLOOD PRESSURE: 126 MMHG | WEIGHT: 162.48 LBS | HEIGHT: 64 IN | RESPIRATION RATE: 14 BRPM | OXYGEN SATURATION: 100 % | DIASTOLIC BLOOD PRESSURE: 84 MMHG

## 2023-09-28 PROCEDURE — 250N000013 HC RX MED GY IP 250 OP 250 PS 637: Performed by: OBSTETRICS & GYNECOLOGY

## 2023-09-28 RX ORDER — OXYCODONE HYDROCHLORIDE 5 MG/1
5 TABLET ORAL EVERY 6 HOURS PRN
Qty: 3 TABLET | Refills: 0 | Status: SHIPPED | OUTPATIENT
Start: 2023-09-28 | End: 2023-11-07

## 2023-09-28 RX ORDER — SENNA AND DOCUSATE SODIUM 50; 8.6 MG/1; MG/1
1-2 TABLET, FILM COATED ORAL 2 TIMES DAILY PRN
Qty: 60 TABLET | Refills: 1 | Status: SHIPPED | OUTPATIENT
Start: 2023-09-28 | End: 2023-11-07

## 2023-09-28 RX ADMIN — SENNOSIDES AND DOCUSATE SODIUM 1 TABLET: 50; 8.6 TABLET ORAL at 11:13

## 2023-09-28 RX ADMIN — IBUPROFEN 800 MG: 800 TABLET ORAL at 11:13

## 2023-09-28 RX ADMIN — IBUPROFEN 800 MG: 800 TABLET ORAL at 05:19

## 2023-09-28 ASSESSMENT — ACTIVITIES OF DAILY LIVING (ADL)
ADLS_ACUITY_SCORE: 18

## 2023-09-28 NOTE — PLAN OF CARE
Data: Vital signs within normal limits. Postpartum checks within normal limits - see flow record. Patient eating and drinking normally. Patient able to empty bladder independently and is up ambulating. No apparent signs of infection. Patient performing self cares. Incision appears within normal. Is using Ibuprofen for mild discomfort.    Action: Patient medicated during the shift for  soreness/incisional . See MAR. Patient education done, see flow record.  Response: Patient's spouse present this shift.   Plan: Continue current plan of care.  Anticipate discharge on 9/28.

## 2023-09-28 NOTE — PROGRESS NOTES
[facility] OB/GYN Postop C/S Note    S:    Patient without complaints other than typical soreness. Baby is still in the NICU and quite tearful about this. Minimal lochia.  Flatus passed, tolerating Regular diet well,    O:   Patient Vitals for the past 24 hrs:   BP Temp Temp src Pulse Resp Weight   09/28/23 0800 -- 97.9  F (36.6  C) Oral -- 14 73.7 kg (162 lb 7.7 oz)   09/28/23 0500 126/84 97.8  F (36.6  C) Oral 78 14 --   09/27/23 1703 115/77 97.6  F (36.4  C) Oral -- 16 --           Abdomen - Non-distended, soft, appropriately tender; Fundus firm, at umbilicus, nontender        Dressing/Incision - clean, dry, intact        Extremities - No calf tenderness    Hemoglobin   Date Value Ref Range Status   09/26/2023 11.2 (L) 11.7 - 15.7 g/dL Final   03/05/2021 12.6 11.7 - 15.7 g/dL Final     A:   Postop Day#3, s/p Primary LTCS         Baby w/ NIPT pos T21 - in NICU    P:  1)  Routine care. Discharge to home        2)  Rh neg, s/p Rhogam        3) Hx of molar pregnancy; placenta path resulted normal    Jimmy Be MD

## 2023-09-28 NOTE — PLAN OF CARE
[id number]    Stable during this shift. Pain medications given and would like to hold off tylenol dose for now.  Visiting baby in NICU. Spouse at  bedside and supportive.

## 2023-09-28 NOTE — PLAN OF CARE
Goal Outcome Evaluation:      Plan of Care Reviewed With: patient, significant other    Overall Patient Progress: improvingOverall Patient Progress: improving    Discharge instructions completed.  Patient states she understands all discharge instructions and all of her questions have been answered.  Patient verbalizes when she needs to return to clinic for follow up for herself and baby.  She is caring for herself and her baby independently.  Prescriptions printed and given to patient/family.  Postpartum depression symptoms reviewed and encouraged frequent review of depression scale. Breast pump ordered and given. Patient discharged with significant other at 1500

## 2023-09-28 NOTE — DISCHARGE SUMMARY
[facility] Discharge Summary    Carri Sauer MRN# [id number]   Age: 38 year old YOB: 1985     Date of Admission:  2023  Date of Discharge::  23    Admitting Physician:  Va Loaiza MD  Discharge Physician:  Deandre Be MD MPH          Admission Diagnoses:   - Intrauterine pregnancy at 39w2d  - Breech presentation  - NIPT +T21  - Hx molar pregnancy          Discharge Diagnosis:   - Same, delivered          Procedures:   - Primary lower transverse  section with double layer uterine closure via Pfannenstiel incision          Medications Prior to Admission:     Medications Prior to Admission   Medication Sig Dispense Refill Last Dose    acetaminophen (TYLENOL) 325 MG tablet Take 325-650 mg by mouth every 6 hours as needed for mild pain   2023    ferrous gluconate (FERGON) 324 (38 Fe) MG tablet Take 324 mg by mouth daily (with breakfast)   2023    Prenatal Vit-Fe Fumarate-FA (PRENATAL VITAMIN) 27-0.8 MG TABS    2023    [DISCONTINUED] aspirin (ASA) 81 MG chewable tablet Take 81 mg by mouth daily   2023              Discharge Medications:        Review of your medicines        START taking        Dose / Directions   oxyCODONE 5 MG tablet  Commonly known as: ROXICODONE      Dose: 5 mg  Take 1 tablet (5 mg) by mouth every 6 hours as needed for severe pain  Quantity: 3 tablet  Refills: 0     SENNA-docusate sodium 8.6-50 MG tablet  Commonly known as: SENNA S      Dose: 1-2 tablet  Take 1-2 tablets by mouth 2 times daily as needed (constipation)  Quantity: 60 tablet  Refills: 1            CONTINUE these medicines which have NOT CHANGED        Dose / Directions   acetaminophen 325 MG tablet  Commonly known as: TYLENOL      Dose: 325-650 mg  Take 325-650 mg by mouth every 6 hours as needed for mild pain  Refills: 0     ferrous gluconate 324 (38 Fe) MG tablet  Commonly known as: FERGON      Dose: 324 mg  Take 324 mg by mouth daily (with  breakfast)  Refills: 0     Prenatal Vitamin 27-0.8 MG Tabs      Refills: 0            STOP taking      aspirin 81 MG chewable tablet  Commonly known as: ASA                  Where to get your medicines        These medications were sent to [place], [place] - [address] [facility]  [address] [facility] [address]      Phone: [phone]   oxyCODONE 5 MG tablet  SENNA-docusate sodium 8.6-50 MG tablet             Brief Admission History:   Ms. Carri Sauer is a 38 year old  who presented at 39w2d for scheduled primary  section.       Intraoperative course   The procedure was uncomplicated.   mL.  See operative report for details.        Postpartum Course   The patient's hospital course was unremarkable.  She recovered as anticipated and experienced no post-operative complications. Her baby unfortunately had to go to the NICU after a desaturation during feeding, and remains there at the time of her discharge. She was discharged on post-partum day #3. She is Rh negative and received Rhogam before discharge.    Post-partum hemoglobin:   Hemoglobin   Date Value Ref Range Status   2023 11.2 (L) 11.7 - 15.7 g/dL Final   2021 12.6 11.7 - 15.7 g/dL Final             Discharge Instructions and Follow-Up:     1) Diet: Regular  2) Feeding: Bottle  3) Contraception: Not discussed  4) Activity: No lifting greater than 20 lbs, pushing, pulling, or other strenuous activity for 6 weeks. Pelvic rest for 6 weeks including no sexual intercourse, tampons, or douching. No driving until you can slam on the breaks without pain or while on narcotic pain medications.   5) Precautions: Call for temperature > 100.4, bright red vaginal bleeding >1 pad an hour x 2 hours, foul smelling vaginal discharge, pain not controlled by usual oral pain meds, persistent nausea and vomiting not controlled on medications, drainage or redness from incision site  6) Follow-up:  -  Primary OB in 6 weeks for routine postpartum visit         Discharge Disposition:   Discharge to home.    Deandre Be MD MPH  09/28/2023 2:33 PM

## 2023-10-24 ENCOUNTER — MEDICAL CORRESPONDENCE (OUTPATIENT)
Dept: HEALTH INFORMATION MANAGEMENT | Facility: CLINIC | Age: 38
End: 2023-10-24
Payer: COMMERCIAL

## 2023-11-07 ENCOUNTER — PRENATAL OFFICE VISIT (OUTPATIENT)
Dept: OBGYN | Facility: CLINIC | Age: 38
End: 2023-11-07
Payer: COMMERCIAL

## 2023-11-07 VITALS
SYSTOLIC BLOOD PRESSURE: 100 MMHG | HEIGHT: 64 IN | BODY MASS INDEX: 25.27 KG/M2 | WEIGHT: 148 LBS | DIASTOLIC BLOOD PRESSURE: 70 MMHG

## 2023-11-07 DIAGNOSIS — Z91.89 AT RISK FOR ANEUPLOIDY: ICD-10-CM

## 2023-11-07 DIAGNOSIS — Z98.891 S/P CESAREAN SECTION: Primary | ICD-10-CM

## 2023-11-07 DIAGNOSIS — O08.89 PARTIAL MOLAR PREGNANCY: ICD-10-CM

## 2023-11-07 LAB
HCG UR QL: NEGATIVE
INTERNAL QC OK POCT: NORMAL
POCT KIT EXPIRATION DATE: NORMAL
POCT KIT LOT NUMBER: NORMAL

## 2023-11-07 PROCEDURE — 99207 PR POST PARTUM EXAM: CPT | Performed by: OBSTETRICS & GYNECOLOGY

## 2023-11-07 PROCEDURE — 81025 URINE PREGNANCY TEST: CPT | Performed by: OBSTETRICS & GYNECOLOGY

## 2023-11-07 ASSESSMENT — PATIENT HEALTH QUESTIONNAIRE - PHQ9: SUM OF ALL RESPONSES TO PHQ QUESTIONS 1-9: 1

## 2023-11-07 NOTE — NURSING NOTE
"Chief Complaint   Patient presents with    Postpartum Care     Del , Froylan, 8 lbs 12 oz       Initial /70 (BP Location: Left arm, Patient Position: Chair, Cuff Size: Adult Regular)   Ht 1.626 m (5' 4\")   Wt 67.1 kg (148 lb)   LMP 10/31/2023 (Exact Date)   Breastfeeding No   BMI 25.40 kg/m   Estimated body mass index is 25.4 kg/m  as calculated from the following:    Height as of this encounter: 1.626 m (5' 4\").    Weight as of this encounter: 67.1 kg (148 lb).  BP completed using cuff size: regular    Questioned patient about current smoking habits.  Pt. has never smoked.          The following HM Due: NONE    Ivory Cadena CMA           "

## 2023-11-11 NOTE — PROGRESS NOTES
"  SUBJECTIVE:                                                   CC:  Postpartum visit    HPI:  Carri Sauer is a 38 year old  s/p PLTCS 6 weeks ago who presents for postpartum follow up.      Baby boy Clive is doing well.  +T21 on karyotyping, but has a very mild facies and very good fetal tone, almost can't even tell he is affected by Down's Syndrome.  Her daughter is doing really well with the addition to the family.  FOB Travis is doing well  Her mood is really good now.   Formula feeding    History of partial mole, needs UPT today    Wt Readings from Last 3 Encounters:   23 67.1 kg (148 lb)   23 73.7 kg (162 lb 7.7 oz)   23 79.4 kg (175 lb)         2023     8:42 AM   PHQ-9 SCORE   PHQ-9 Total Score 1          No data to display                  ROS: 10 point ROS negative other than as listed above in HPI.       Last 3 Pap and HPV Results:       Latest Ref Rng & Units 3/5/2021    10:52 AM 3/5/2021    10:50 AM   PAP / HPV   PAP (Historical)  NIL     HPV 16 DNA NEG^Negative  Negative    HPV 18 DNA NEG^Negative  Negative    Other HR HPV NEG^Negative  Negative          PMH, PSH, Soc Hx, Fam Hx, Meds, and allergies reviewed in Epic.    OBJECTIVE:     /70 (BP Location: Left arm, Patient Position: Chair, Cuff Size: Adult Regular)   Ht 1.626 m (5' 4\")   Wt 67.1 kg (148 lb)   LMP 10/31/2023 (Exact Date)   Breastfeeding No   BMI 25.40 kg/m        Gen: Healthy appearing female, no acute distress, comfortable.  Well groomed, good eye contact, animated affect, calm and happy.    HENT: No scleral injection or icterus  CV: Regular rate  Resp: Normal work of breathing, no cough  Psychiatric: mentation appears normal and affect bright  Pfannenstiel c/d/I well healing, dermabond removed    ASSESSMENT/PLAN:                                                      1. S/P  section  Removed dermabond today    2. Partial molar pregnancy  UPT today    3. At risk for aneuploidy  Son " with T21, she is going to DS clinic and things are overall going very very well.    4. Routine postpartum follow-up  - HCG qualitative urine POCT, Enter/Edit Result  Doing well, see HPI.  Discussed risk of postpartum anxiety/depression for up to a year.  Undecided for contraception.      Va Loaiza MD, MPH  Obstetrics and Gynecology

## 2024-03-20 NOTE — NURSING NOTE
Chief Complaint   Patient presents with    Hypertension     Pt arrives per triage from home with c/o elevated BP pt was admitted and discharged x 2 days ago for HTN and per pt is on a protocol for elevated BP and did not take lisinopril this morning. Pt very anxious at triage. Denies any CP or SOB   Patient reports good fetal movement, reports occasoinal contractions, leaking of fluid, or bleeding.

## 2024-08-04 ENCOUNTER — HEALTH MAINTENANCE LETTER (OUTPATIENT)
Age: 39
End: 2024-08-04

## 2025-08-16 ENCOUNTER — HEALTH MAINTENANCE LETTER (OUTPATIENT)
Age: 40
End: 2025-08-16

## (undated) DEVICE — DRSG ABDOMINAL 07 1/2X8" 7197D

## (undated) DEVICE — SU DERMABOND ADVANCED .7ML DNX12

## (undated) DEVICE — SU PLAIN 3-0 CT 27" 852H

## (undated) DEVICE — Device

## (undated) DEVICE — LINEN TOWEL PACK X10 5473

## (undated) DEVICE — LINEN HALF SHEET 5512

## (undated) DEVICE — SOL NACL 0.9% IRRIG 1000ML BOTTLE 2F7124

## (undated) DEVICE — CAP BABY PINK/BLUE IC-2

## (undated) DEVICE — DRSG TELFA ISLAND 4X10"

## (undated) DEVICE — BLADE CLIPPER SGL USE 9680

## (undated) DEVICE — PACK C-SECTION LF PL15OTA83B

## (undated) DEVICE — BLADE KNIFE SURG 10 371110

## (undated) DEVICE — SU VICRYL 3-0 KS 27" UND J663H

## (undated) DEVICE — GLOVE BIOGEL PI MICRO INDICATOR UNDERGLOVE SZ 7.0 48970

## (undated) DEVICE — BAG CLEAR TRASH 1.3M 39X33" P4040C

## (undated) DEVICE — PAD CHUX UNDERPAD 30X36" P3036C

## (undated) DEVICE — ESU GROUND PAD ADULT W/CORD E7507

## (undated) DEVICE — SU VICRYL 0 CT-1 36" J346H

## (undated) DEVICE — PREP CHLORAPREP 26ML TINTED ORANGE  260815

## (undated) DEVICE — PREP CHLORAPREP 26ML TINTED HI-LITE ORANGE 930815

## (undated) DEVICE — GLOVE BIOGEL PI MICRO SZ 6.5 48565

## (undated) DEVICE — SU MONOCRYL 0 CT-1 36" UND Y946H

## (undated) DEVICE — STOCKING SLEEVE VASOPRESS COMPRESSION CALF MED 18" VP501M

## (undated) DEVICE — LINEN DRAPE 54X72" 5467

## (undated) DEVICE — SOL WATER IRRIG 1000ML BOTTLE 2F7114

## (undated) DEVICE — TRANSFER DEVICE BLOOD NDL HOLDER 364880

## (undated) DEVICE — LINEN FULL SHEET 5511

## (undated) DEVICE — CATH TRAY FOLEY SURESTEP 16FR DRAIN BAG STATOCK A899916

## (undated) RX ORDER — FENTANYL CITRATE-0.9 % NACL/PF 10 MCG/ML
PLASTIC BAG, INJECTION (ML) INTRAVENOUS
Status: DISPENSED
Start: 2023-09-25

## (undated) RX ORDER — KETOROLAC TROMETHAMINE 30 MG/ML
INJECTION, SOLUTION INTRAMUSCULAR; INTRAVENOUS
Status: DISPENSED
Start: 2023-09-25

## (undated) RX ORDER — OXYTOCIN/0.9 % SODIUM CHLORIDE 30/500 ML
PLASTIC BAG, INJECTION (ML) INTRAVENOUS
Status: DISPENSED
Start: 2023-09-25

## (undated) RX ORDER — FENTANYL CITRATE 50 UG/ML
INJECTION, SOLUTION INTRAMUSCULAR; INTRAVENOUS
Status: DISPENSED
Start: 2023-09-25

## (undated) RX ORDER — ONDANSETRON 2 MG/ML
INJECTION INTRAMUSCULAR; INTRAVENOUS
Status: DISPENSED
Start: 2023-09-25

## (undated) RX ORDER — MORPHINE SULFATE 1 MG/ML
INJECTION, SOLUTION EPIDURAL; INTRATHECAL; INTRAVENOUS
Status: DISPENSED
Start: 2023-09-25